# Patient Record
Sex: FEMALE | Race: WHITE | NOT HISPANIC OR LATINO | Employment: OTHER | URBAN - METROPOLITAN AREA
[De-identification: names, ages, dates, MRNs, and addresses within clinical notes are randomized per-mention and may not be internally consistent; named-entity substitution may affect disease eponyms.]

---

## 2017-01-28 ENCOUNTER — TELEPHONE (OUTPATIENT)
Dept: INTERNAL MEDICINE | Facility: CLINIC | Age: 72
End: 2017-01-28

## 2017-02-21 DIAGNOSIS — R68.81 EARLY SATIETY: ICD-10-CM

## 2017-02-21 DIAGNOSIS — Z72.0 TOBACCO USE: Primary | ICD-10-CM

## 2017-02-24 ENCOUNTER — OFFICE VISIT (OUTPATIENT)
Dept: INTERNAL MEDICINE | Facility: CLINIC | Age: 72
End: 2017-02-24

## 2017-02-24 VITALS
RESPIRATION RATE: 16 BRPM | WEIGHT: 261.4 LBS | BODY MASS INDEX: 51.32 KG/M2 | SYSTOLIC BLOOD PRESSURE: 112 MMHG | TEMPERATURE: 98.6 F | HEIGHT: 60 IN | HEART RATE: 64 BPM | DIASTOLIC BLOOD PRESSURE: 70 MMHG

## 2017-02-24 DIAGNOSIS — E53.8 B12 DEFICIENCY: ICD-10-CM

## 2017-02-24 DIAGNOSIS — E03.8 OTHER SPECIFIED HYPOTHYROIDISM: ICD-10-CM

## 2017-02-24 DIAGNOSIS — Z72.0 TOBACCO ABUSE: ICD-10-CM

## 2017-02-24 DIAGNOSIS — K59.00 CONSTIPATION, UNSPECIFIED CONSTIPATION TYPE: ICD-10-CM

## 2017-02-24 DIAGNOSIS — Z11.59 NEED FOR HEPATITIS C SCREENING TEST: ICD-10-CM

## 2017-02-24 DIAGNOSIS — Z00.00 ENCOUNTER FOR MEDICARE ANNUAL WELLNESS EXAM: Primary | ICD-10-CM

## 2017-02-24 DIAGNOSIS — F41.9 ANXIETY: ICD-10-CM

## 2017-02-24 DIAGNOSIS — E55.9 VITAMIN D DEFICIENCY: ICD-10-CM

## 2017-02-24 DIAGNOSIS — I10 ESSENTIAL HYPERTENSION: ICD-10-CM

## 2017-02-24 LAB
BILIRUB BLD-MCNC: NEGATIVE MG/DL
CLARITY, POC: CLEAR
COLOR UR: YELLOW
GLUCOSE UR STRIP-MCNC: NEGATIVE MG/DL
KETONES UR QL: NEGATIVE
LEUKOCYTE EST, POC: NEGATIVE
NITRITE UR-MCNC: NEGATIVE MG/ML
PH UR: 5 [PH] (ref 5–8)
PROT UR STRIP-MCNC: NEGATIVE MG/DL
RBC # UR STRIP: NEGATIVE /UL
SP GR UR: 1.03 (ref 1–1.03)
UROBILINOGEN UR QL: ABNORMAL

## 2017-02-24 PROCEDURE — 99406 BEHAV CHNG SMOKING 3-10 MIN: CPT | Performed by: INTERNAL MEDICINE

## 2017-02-24 PROCEDURE — G0439 PPPS, SUBSEQ VISIT: HCPCS | Performed by: INTERNAL MEDICINE

## 2017-02-24 PROCEDURE — 81003 URINALYSIS AUTO W/O SCOPE: CPT | Performed by: INTERNAL MEDICINE

## 2017-02-24 PROCEDURE — 96160 PT-FOCUSED HLTH RISK ASSMT: CPT | Performed by: INTERNAL MEDICINE

## 2017-02-24 RX ORDER — DOXYCYCLINE HYCLATE 100 MG/1
100 CAPSULE ORAL DAILY
COMMUNITY
End: 2019-11-20

## 2017-02-24 RX ORDER — ALPRAZOLAM 0.5 MG/1
0.5 TABLET ORAL 2 TIMES DAILY PRN
Qty: 60 TABLET | Refills: 2 | Status: SHIPPED | OUTPATIENT
Start: 2017-02-24 | End: 2017-08-23 | Stop reason: SDUPTHER

## 2017-02-24 RX ORDER — LEVOTHYROXINE SODIUM 137 UG/1
TABLET ORAL
Qty: 30 TABLET | Refills: 3 | Status: CANCELLED | OUTPATIENT
Start: 2017-02-24

## 2017-02-24 NOTE — PROGRESS NOTES
"Subjective   Carol Cooley is a 71 y.o. female.     History of Present Illness     Here for wellness exam  specialists:  derm - Massa  uro - Tao  gi - pezzi  living will - has but needs to update.   depression screen neg - 0  Fall risk - normal  Memory screen - neg  d daily, senna daily  Exercise - she has been physically active as she moved. Feels like she can start walking more  Diet - tries to eat healthy, but not a lot of veggies. Tries to stay away from sweets. On b12 and D 2000, senna  Smoking about 5 cigs a day    uti prevention - she is on doxy now as macrobid was too expensive    Anxiety - takes xanax 1-2 Day generally. No problems w/ it. Filled in 2/17, but would like to go ahead and get refill today while here.      She is still smoking 1 pack per week but wants to try to quit again      The following portions of the patient's history were reviewed and updated as appropriate: allergies, current medications, past family history, past medical history, past social history, past surgical history and problem list.    Review of Systems   Constitutional: Positive for activity change (more active). Negative for appetite change, fatigue, fever and unexpected weight change.   Respiratory: Negative for cough and shortness of breath.    Cardiovascular: Negative for chest pain and leg swelling.   Gastrointestinal: Negative for abdominal pain, blood in stool and diarrhea.        Usuallly regular several days in a row, then no bm for several days   Genitourinary: Negative for dysuria, frequency and hematuria.   Skin:        H/o lichen simplex chronicus   Psychiatric/Behavioral: The patient is nervous/anxious.         Xanax working well       Objective   Blood pressure 112/70, pulse 64, temperature 98.6 °F (37 °C), temperature source Temporal Artery , resp. rate 16, height 60.3\" (153.2 cm), weight 261 lb 6.4 oz (119 kg).  Physical Exam   Constitutional: She is oriented to person, place, and time. She appears " well-developed and well-nourished.   HENT:   Head: Normocephalic and atraumatic.   Right Ear: External ear normal.   Left Ear: External ear normal.   Mouth/Throat: Oropharynx is clear and moist. No oropharyngeal exudate.   Eyes: Conjunctivae and EOM are normal. Pupils are equal, round, and reactive to light. Right eye exhibits no discharge. Left eye exhibits no discharge.   Neck: Normal range of motion. Neck supple. No JVD present. No thyromegaly present.   Cardiovascular: Normal rate, regular rhythm, normal heart sounds and intact distal pulses.  Exam reveals no gallop and no friction rub.    No murmur heard.  Pulmonary/Chest: Breath sounds normal. No respiratory distress. She has no wheezes. She has no rales. Right breast exhibits no inverted nipple, no mass, no nipple discharge, no skin change and no tenderness. Left breast exhibits no inverted nipple, no mass, no nipple discharge, no skin change and no tenderness.   Abdominal: Soft. Bowel sounds are normal. She exhibits no distension and no mass. There is no tenderness. There is no guarding.   Neurological: She is alert and oriented to person, place, and time.   Skin: Skin is warm and dry.   Lichen simplex chronicus changes   Psychiatric: She has a normal mood and affect. Her behavior is normal. Judgment and thought content normal.       Assessment/Plan   Carol was seen today for annual exam.    Diagnoses and all orders for this visit:    Encounter for Medicare annual wellness exam.   -     POCT urinalysis dipstick, automatedregular exercise/healthy diet. weight loss encouraged. BSE q month. colon is scheduled for 3/17.she has had pneumovax and prevnar. she declines  zostavax.    lung ca screening due in 6/17. dexa due in '18.. labs today. pap done 1/16 and was normal.      Constipation, unspecified constipation type    Other specified hypothyroidism  -     TSH    Anxiety. Pt has already signed controlled substance contract. Navneet done today and appropriate.  UDS next visit  -     ALPRAZolam (XANAX) 0.5 MG tablet; Take 1 tablet by mouth 2 (Two) Times a Day As Needed for anxiety.      Essential hypertension - good control  -     Comprehensive Metabolic Panel  -     Lipid Panel  -     CBC & Differential    Vitamin D deficiency  -     Vitamin D 25 Hydroxy    B12 deficiency  -     Vitamin B12    Need for hepatitis C screening test  -     Hepatitis C Antibody    Tobacco abuse - Smoking cessation encouraged - ~5 minutes spent on smoking cessation.

## 2017-02-25 LAB
25(OH)D3+25(OH)D2 SERPL-MCNC: 35.2 NG/ML
ALBUMIN SERPL-MCNC: 4.1 G/DL (ref 3.2–4.8)
ALBUMIN/GLOB SERPL: 1.6 G/DL (ref 1.5–2.5)
ALP SERPL-CCNC: 62 U/L (ref 25–100)
ALT SERPL-CCNC: 21 U/L (ref 7–40)
AST SERPL-CCNC: 22 U/L (ref 0–33)
BASOPHILS # BLD AUTO: 0.07 10*3/MM3 (ref 0–0.2)
BASOPHILS NFR BLD AUTO: 0.8 % (ref 0–1)
BILIRUB SERPL-MCNC: 0.4 MG/DL (ref 0.3–1.2)
BUN SERPL-MCNC: 17 MG/DL (ref 9–23)
BUN/CREAT SERPL: 17 (ref 7–25)
CALCIUM SERPL-MCNC: 9.8 MG/DL (ref 8.7–10.4)
CHLORIDE SERPL-SCNC: 109 MMOL/L (ref 99–109)
CHOLEST SERPL-MCNC: 174 MG/DL (ref 0–200)
CO2 SERPL-SCNC: 24 MMOL/L (ref 20–31)
CREAT SERPL-MCNC: 1 MG/DL (ref 0.6–1.3)
EOSINOPHIL # BLD AUTO: 0.23 10*3/MM3 (ref 0.1–0.3)
EOSINOPHIL NFR BLD AUTO: 2.7 % (ref 0–3)
ERYTHROCYTE [DISTWIDTH] IN BLOOD BY AUTOMATED COUNT: 13.3 % (ref 11.3–14.5)
GLOBULIN SER CALC-MCNC: 2.6 GM/DL
GLUCOSE SERPL-MCNC: 85 MG/DL (ref 70–100)
HCT VFR BLD AUTO: 41.1 % (ref 34.5–44)
HCV AB S/CO SERPL IA: 0.1 S/CO RATIO (ref 0–0.9)
HDLC SERPL-MCNC: 55 MG/DL (ref 40–60)
HGB BLD-MCNC: 13.3 G/DL (ref 11.5–15.5)
IMM GRANULOCYTES # BLD: 0.02 10*3/MM3 (ref 0–0.03)
IMM GRANULOCYTES NFR BLD: 0.2 % (ref 0–0.6)
LDLC SERPL CALC-MCNC: 104 MG/DL (ref 0–100)
LYMPHOCYTES # BLD AUTO: 2.06 10*3/MM3 (ref 0.6–4.8)
LYMPHOCYTES NFR BLD AUTO: 23.8 % (ref 24–44)
MCH RBC QN AUTO: 30.2 PG (ref 27–31)
MCHC RBC AUTO-ENTMCNC: 32.4 G/DL (ref 32–36)
MCV RBC AUTO: 93.4 FL (ref 80–99)
MONOCYTES # BLD AUTO: 0.86 10*3/MM3 (ref 0–1)
MONOCYTES NFR BLD AUTO: 9.9 % (ref 0–12)
NEUTROPHILS # BLD AUTO: 5.42 10*3/MM3 (ref 1.5–8.3)
NEUTROPHILS NFR BLD AUTO: 62.6 % (ref 41–71)
PLATELET # BLD AUTO: 176 10*3/MM3 (ref 150–450)
POTASSIUM SERPL-SCNC: 4.3 MMOL/L (ref 3.5–5.5)
PROT SERPL-MCNC: 6.7 G/DL (ref 5.7–8.2)
RBC # BLD AUTO: 4.4 10*6/MM3 (ref 3.89–5.14)
SODIUM SERPL-SCNC: 142 MMOL/L (ref 132–146)
TRIGL SERPL-MCNC: 76 MG/DL (ref 0–150)
TSH SERPL DL<=0.005 MIU/L-ACNC: 2.44 MIU/ML (ref 0.35–5.35)
VIT B12 SERPL-MCNC: >2000 PG/ML (ref 211–911)
VLDLC SERPL CALC-MCNC: 15.2 MG/DL
WBC # BLD AUTO: 8.66 10*3/MM3 (ref 3.5–10.8)

## 2017-02-27 ENCOUNTER — TELEPHONE (OUTPATIENT)
Dept: INTERNAL MEDICINE | Facility: CLINIC | Age: 72
End: 2017-02-27

## 2017-02-27 DIAGNOSIS — Z12.11 SCREENING FOR COLON CANCER: Primary | ICD-10-CM

## 2017-02-27 DIAGNOSIS — K21.9 GASTROESOPHAGEAL REFLUX DISEASE, ESOPHAGITIS PRESENCE NOT SPECIFIED: Primary | ICD-10-CM

## 2017-02-27 NOTE — TELEPHONE ENCOUNTER
----- Message from Briana Nelson MD sent at 2/25/2017 11:42 AM EST -----  Regarding: lab results  Can you fax her labs from last week to Dr Eden Hammer and Dr Tao (urology)

## 2017-03-04 ENCOUNTER — RESULTS ENCOUNTER (OUTPATIENT)
Dept: GASTROENTEROLOGY | Facility: CLINIC | Age: 72
End: 2017-03-04

## 2017-03-04 DIAGNOSIS — Z12.11 SCREENING FOR COLON CANCER: ICD-10-CM

## 2017-03-04 DIAGNOSIS — K21.9 GASTROESOPHAGEAL REFLUX DISEASE, ESOPHAGITIS PRESENCE NOT SPECIFIED: ICD-10-CM

## 2017-03-21 RX ORDER — ATENOLOL 50 MG/1
50 TABLET ORAL DAILY
Qty: 90 TABLET | Refills: 1 | Status: SHIPPED | OUTPATIENT
Start: 2017-03-21 | End: 2017-03-29 | Stop reason: SDUPTHER

## 2017-03-27 ENCOUNTER — TELEPHONE (OUTPATIENT)
Dept: INTERNAL MEDICINE | Facility: CLINIC | Age: 72
End: 2017-03-27

## 2017-03-27 NOTE — TELEPHONE ENCOUNTER
----- Message from Gayathri Franz sent at 3/27/2017  2:26 PM EDT -----  PLEASE RESEND PT RX FOR ATENOLOL PHARMACY DID NOT REC

## 2017-03-28 DIAGNOSIS — Z12.11 COLON CANCER SCREENING: Primary | ICD-10-CM

## 2017-03-29 ENCOUNTER — TELEPHONE (OUTPATIENT)
Dept: INTERNAL MEDICINE | Facility: CLINIC | Age: 72
End: 2017-03-29

## 2017-03-29 RX ORDER — ATENOLOL 50 MG/1
50 TABLET ORAL DAILY
Qty: 90 TABLET | Refills: 1 | Status: SHIPPED | OUTPATIENT
Start: 2017-03-29 | End: 2018-03-21

## 2017-03-29 NOTE — TELEPHONE ENCOUNTER
----- Message from Sheela Lake sent at 3/29/2017  9:05 AM EDT -----  Contact: [facility] pharmacy  Pharmacy called about Ms Cooley's  atenolog 50 mg, says they never received the script [phone]

## 2017-04-25 RX ORDER — LEVOTHYROXINE SODIUM 137 UG/1
TABLET ORAL
Qty: 30 TABLET | Refills: 3 | Status: SHIPPED | OUTPATIENT
Start: 2017-04-25 | End: 2017-08-28 | Stop reason: SDUPTHER

## 2017-05-23 ENCOUNTER — TELEPHONE (OUTPATIENT)
Dept: INTERNAL MEDICINE | Facility: CLINIC | Age: 72
End: 2017-05-23

## 2017-05-23 ENCOUNTER — CLINICAL SUPPORT (OUTPATIENT)
Dept: INTERNAL MEDICINE | Facility: CLINIC | Age: 72
End: 2017-05-23

## 2017-05-23 VITALS — DIASTOLIC BLOOD PRESSURE: 66 MMHG | SYSTOLIC BLOOD PRESSURE: 125 MMHG

## 2017-05-23 DIAGNOSIS — I10 ESSENTIAL HYPERTENSION: ICD-10-CM

## 2017-06-16 ENCOUNTER — PATIENT MESSAGE (OUTPATIENT)
Dept: INTERNAL MEDICINE | Facility: CLINIC | Age: 72
End: 2017-06-16

## 2017-06-16 DIAGNOSIS — Z87.891 PERSONAL HISTORY OF TOBACCO USE, PRESENTING HAZARDS TO HEALTH: Primary | ICD-10-CM

## 2017-06-19 ENCOUNTER — HOSPITAL ENCOUNTER (OUTPATIENT)
Dept: CT IMAGING | Facility: HOSPITAL | Age: 72
Discharge: HOME | End: 2017-06-19
Admitting: INTERNAL MEDICINE

## 2017-06-19 PROCEDURE — G0297 LDCT FOR LUNG CA SCREEN: HCPCS

## 2017-08-01 RX ORDER — BUPROPION HYDROCHLORIDE 150 MG/1
TABLET ORAL
Qty: 30 TABLET | Refills: 0 | Status: SHIPPED | OUTPATIENT
Start: 2017-08-01 | End: 2017-08-28 | Stop reason: SDUPTHER

## 2017-08-01 RX ORDER — BUPROPION HYDROCHLORIDE 300 MG/1
TABLET ORAL
Qty: 30 TABLET | Refills: 0 | Status: SHIPPED | OUTPATIENT
Start: 2017-08-01 | End: 2017-08-28 | Stop reason: SDUPTHER

## 2017-08-23 ENCOUNTER — OFFICE VISIT (OUTPATIENT)
Dept: INTERNAL MEDICINE | Facility: CLINIC | Age: 72
End: 2017-08-23

## 2017-08-23 VITALS
BODY MASS INDEX: 44.25 KG/M2 | TEMPERATURE: 97.7 F | HEART RATE: 79 BPM | RESPIRATION RATE: 16 BRPM | HEIGHT: 60 IN | WEIGHT: 225.4 LBS | SYSTOLIC BLOOD PRESSURE: 118 MMHG | OXYGEN SATURATION: 95 % | DIASTOLIC BLOOD PRESSURE: 68 MMHG

## 2017-08-23 DIAGNOSIS — F41.9 ANXIETY: ICD-10-CM

## 2017-08-23 DIAGNOSIS — G47.09 OTHER INSOMNIA: Primary | ICD-10-CM

## 2017-08-23 DIAGNOSIS — E03.8 OTHER SPECIFIED HYPOTHYROIDISM: ICD-10-CM

## 2017-08-23 DIAGNOSIS — Z79.899 HIGH RISK MEDICATIONS (NOT ANTICOAGULANTS) LONG-TERM USE: ICD-10-CM

## 2017-08-23 DIAGNOSIS — I10 ESSENTIAL HYPERTENSION: ICD-10-CM

## 2017-08-23 DIAGNOSIS — Z72.0 TOBACCO ABUSE: ICD-10-CM

## 2017-08-23 PROCEDURE — 99213 OFFICE O/P EST LOW 20 MIN: CPT | Performed by: INTERNAL MEDICINE

## 2017-08-23 PROCEDURE — 99406 BEHAV CHNG SMOKING 3-10 MIN: CPT | Performed by: INTERNAL MEDICINE

## 2017-08-23 RX ORDER — ALPRAZOLAM 0.5 MG/1
0.5 TABLET ORAL 2 TIMES DAILY PRN
Qty: 60 TABLET | Refills: 2 | Status: SHIPPED | OUTPATIENT
Start: 2017-08-23 | End: 2018-03-21 | Stop reason: SDUPTHER

## 2017-08-23 NOTE — PROGRESS NOTES
"Subjective   Carol Cooley is a 72 y.o. female.     History of Present Illness     htn - we had decreased her atenolol to 1/2 qd in May as she had a few low readings. Have been still a little low in the 100-120s/40-60s. Pulse a little higher in the 80s and occasionally 90s     Insomnia/anxiety - takes xanax 1-2 Day generally. No problems w/ it. Does not make her groggy next day     She is still smoking 1 pack every 3 days. She does have patches but not using    She has been doing weight watchers and has lost about 30 lbs. Plans to start exercising soon too    The following portions of the patient's history were reviewed and updated as appropriate: allergies, current medications, past family history, past medical history, past social history, past surgical history and problem list.    Review of Systems   Constitutional: Positive for activity change (more active) and unexpected weight change (trying to lsoe weight). Negative for appetite change, fatigue and fever.   Respiratory: Negative for cough and shortness of breath.    Cardiovascular: Negative for chest pain and leg swelling.   Gastrointestinal: Negative for abdominal pain, blood in stool and diarrhea.        Usuallly regular several days in a row, then no bm for several days   Genitourinary: Negative for dysuria, frequency and hematuria.   Skin:        H/o lichen simplex chronicus   Psychiatric/Behavioral: Positive for sleep disturbance. The patient is nervous/anxious.         Xanax working well       Objective   Blood pressure 118/68, pulse 79, temperature 97.7 °F (36.5 °C), temperature source Temporal Artery , resp. rate 16, height 60.3\" (153.2 cm), weight 225 lb 6.4 oz (102 kg), SpO2 95 %.  Physical Exam   Constitutional: She is oriented to person, place, and time. She appears well-developed and well-nourished. No distress.   HENT:   Head: Normocephalic and atraumatic.   Eyes: Conjunctivae and EOM are normal.   Cardiovascular: Normal rate, regular rhythm " and normal heart sounds.  Exam reveals no gallop and no friction rub.    No murmur heard.  Pulmonary/Chest: Effort normal and breath sounds normal. No respiratory distress. She has no wheezes.   Neurological: She is alert and oriented to person, place, and time.   Skin: Skin is warm and dry. She is not diaphoretic.   Psychiatric: She has a normal mood and affect. Her behavior is normal. Judgment and thought content normal.       Assessment/Plan   Carol was seen today for med refill.    Diagnoses and all orders for this visit:    Anxiety/ insomnia -Pt has already signed controlled substance contract. Navneet done today and appropriate. UDS due today  -     ALPRAZolam (XANAX) 0.5 MG tablet; Take 1 tablet by mouth 2 (Two) Times a Day As Needed for Anxiety.    High risk medications (not anticoagulants) long-term use  -     Urine Drug Screen    Tobacco abuse - Smoking cessation encouraged - ~5 minutes spent on smoking cessation.     HTN - good control - ok to wean to 1/2 qod on atenolol x 1 week then d/c. Continue to monitor BP and HR - call if any problems    Hypothyroid - recheck in 6m

## 2017-08-28 RX ORDER — BUPROPION HYDROCHLORIDE 300 MG/1
300 TABLET ORAL EVERY MORNING
Qty: 90 TABLET | Refills: 1 | Status: SHIPPED | OUTPATIENT
Start: 2017-08-28 | End: 2018-03-21 | Stop reason: SDUPTHER

## 2017-08-28 RX ORDER — LEVOTHYROXINE SODIUM 137 UG/1
137 TABLET ORAL DAILY
Qty: 90 TABLET | Refills: 1 | Status: SHIPPED | OUTPATIENT
Start: 2017-08-28 | End: 2018-03-21 | Stop reason: SDUPTHER

## 2017-08-28 RX ORDER — BUPROPION HYDROCHLORIDE 150 MG/1
150 TABLET ORAL EVERY MORNING
Qty: 90 TABLET | Refills: 1 | Status: SHIPPED | OUTPATIENT
Start: 2017-08-28 | End: 2018-03-21 | Stop reason: SDUPTHER

## 2017-09-01 RX ORDER — LEVOTHYROXINE SODIUM 137 UG/1
TABLET ORAL
Qty: 30 TABLET | Refills: 5 | Status: SHIPPED | OUTPATIENT
Start: 2017-09-01 | End: 2018-03-26 | Stop reason: SDUPTHER

## 2017-09-01 RX ORDER — BUPROPION HYDROCHLORIDE 300 MG/1
TABLET ORAL
Qty: 30 TABLET | Refills: 5 | Status: SHIPPED | OUTPATIENT
Start: 2017-09-01 | End: 2018-03-21 | Stop reason: SDUPTHER

## 2017-09-01 RX ORDER — BUPROPION HYDROCHLORIDE 150 MG/1
TABLET ORAL
Qty: 30 TABLET | Refills: 5 | Status: SHIPPED | OUTPATIENT
Start: 2017-09-01 | End: 2018-03-21 | Stop reason: SDUPTHER

## 2017-09-18 ENCOUNTER — TELEPHONE (OUTPATIENT)
Dept: INTERNAL MEDICINE | Facility: CLINIC | Age: 72
End: 2017-09-18

## 2017-09-18 NOTE — TELEPHONE ENCOUNTER
----- Message from Carol Lenora sent at 9/18/2017  2:27 PM EDT -----  Regarding: Non-Urgent Medical Question  Contact: [phone]  FYI. After eliminating the Atenolol my bp went up but not much, however, my pulse was staying between 90 and 95.  Since I'm exercising more I decided to take 1/4 of an Atenolol daily.  Seems to be working well.  Been staying around 110/70 and pulse in the low 70's resting.  I have plenty but thought you should know in case I need a refill before my next visit.  Let me know if you think I'm wrong on this.  Thank you.

## 2017-10-03 ENCOUNTER — CLINICAL SUPPORT (OUTPATIENT)
Dept: INTERNAL MEDICINE | Facility: CLINIC | Age: 72
End: 2017-10-03

## 2017-10-03 PROCEDURE — 90662 IIV NO PRSV INCREASED AG IM: CPT | Performed by: FAMILY MEDICINE

## 2017-10-03 PROCEDURE — G0008 ADMIN INFLUENZA VIRUS VAC: HCPCS | Performed by: FAMILY MEDICINE

## 2018-03-21 ENCOUNTER — TELEPHONE (OUTPATIENT)
Dept: INTERNAL MEDICINE | Facility: CLINIC | Age: 73
End: 2018-03-21

## 2018-03-21 ENCOUNTER — OFFICE VISIT (OUTPATIENT)
Dept: INTERNAL MEDICINE | Facility: CLINIC | Age: 73
End: 2018-03-21

## 2018-03-21 VITALS
TEMPERATURE: 97.9 F | HEART RATE: 81 BPM | WEIGHT: 183 LBS | OXYGEN SATURATION: 96 % | HEIGHT: 60 IN | SYSTOLIC BLOOD PRESSURE: 122 MMHG | DIASTOLIC BLOOD PRESSURE: 70 MMHG | BODY MASS INDEX: 35.93 KG/M2

## 2018-03-21 DIAGNOSIS — Z78.0 POSTMENOPAUSAL: ICD-10-CM

## 2018-03-21 DIAGNOSIS — N39.0 URINARY TRACT INFECTION WITHOUT HEMATURIA, SITE UNSPECIFIED: ICD-10-CM

## 2018-03-21 DIAGNOSIS — K59.09 OTHER CONSTIPATION: ICD-10-CM

## 2018-03-21 DIAGNOSIS — Z00.00 ROUTINE GENERAL MEDICAL EXAMINATION AT A HEALTH CARE FACILITY: ICD-10-CM

## 2018-03-21 DIAGNOSIS — Z00.00 MEDICARE ANNUAL WELLNESS VISIT, SUBSEQUENT: Primary | ICD-10-CM

## 2018-03-21 DIAGNOSIS — F41.9 ANXIETY: ICD-10-CM

## 2018-03-21 DIAGNOSIS — G47.09 OTHER INSOMNIA: ICD-10-CM

## 2018-03-21 DIAGNOSIS — K59.00 CONSTIPATION, UNSPECIFIED CONSTIPATION TYPE: ICD-10-CM

## 2018-03-21 DIAGNOSIS — I10 ESSENTIAL HYPERTENSION: ICD-10-CM

## 2018-03-21 DIAGNOSIS — E03.9 ACQUIRED HYPOTHYROIDISM: ICD-10-CM

## 2018-03-21 DIAGNOSIS — Z72.0 TOBACCO ABUSE: ICD-10-CM

## 2018-03-21 LAB
BILIRUB BLD-MCNC: NEGATIVE MG/DL
CLARITY, POC: ABNORMAL
COLOR UR: YELLOW
GLUCOSE UR STRIP-MCNC: NEGATIVE MG/DL
KETONES UR QL: NEGATIVE
LEUKOCYTE EST, POC: ABNORMAL
NITRITE UR-MCNC: POSITIVE MG/ML
PH UR: 5.5 [PH] (ref 5–8)
PROT UR STRIP-MCNC: NEGATIVE MG/DL
RBC # UR STRIP: ABNORMAL /UL
SP GR UR: 1.03 (ref 1–1.03)
UROBILINOGEN UR QL: NORMAL

## 2018-03-21 PROCEDURE — 99397 PER PM REEVAL EST PAT 65+ YR: CPT | Performed by: INTERNAL MEDICINE

## 2018-03-21 PROCEDURE — 99406 BEHAV CHNG SMOKING 3-10 MIN: CPT | Performed by: INTERNAL MEDICINE

## 2018-03-21 PROCEDURE — 96160 PT-FOCUSED HLTH RISK ASSMT: CPT | Performed by: INTERNAL MEDICINE

## 2018-03-21 PROCEDURE — G0439 PPPS, SUBSEQ VISIT: HCPCS | Performed by: INTERNAL MEDICINE

## 2018-03-21 PROCEDURE — 81003 URINALYSIS AUTO W/O SCOPE: CPT | Performed by: INTERNAL MEDICINE

## 2018-03-21 RX ORDER — BUPROPION HYDROCHLORIDE 300 MG/1
300 TABLET ORAL DAILY
Qty: 30 TABLET | Refills: 11 | Status: SHIPPED | OUTPATIENT
Start: 2018-03-21 | End: 2019-02-25 | Stop reason: SDUPTHER

## 2018-03-21 RX ORDER — ALPRAZOLAM 0.5 MG/1
0.5 TABLET ORAL 2 TIMES DAILY PRN
Qty: 60 TABLET | Refills: 2 | Status: SHIPPED | OUTPATIENT
Start: 2018-03-21 | End: 2018-09-17 | Stop reason: SDUPTHER

## 2018-03-21 RX ORDER — BUPROPION HYDROCHLORIDE 150 MG/1
150 TABLET ORAL DAILY
Qty: 30 TABLET | Refills: 11 | Status: SHIPPED | OUTPATIENT
Start: 2018-03-21 | End: 2019-02-25 | Stop reason: SDUPTHER

## 2018-03-21 NOTE — TELEPHONE ENCOUNTER
----- Message from Briana Nelson MD sent at 3/21/2018  2:33 PM EDT -----  Regarding: colon report  She had colon at [facility] last year - can we get report

## 2018-03-21 NOTE — PROGRESS NOTES
"Subjective   Carol Cooley is a 73 y.o. female.     History of Present Illness   Here for wellness exam and physical    She had seen Dr Tao in the fall and had UTI then though asymptomatic and he did treat her w/ antibiotics and she is on doxy daily for prevention. Her next appt w/ him is in May    specialists:  derm - Jaquelin  uro - Dinh  gi - pezzi  Eyes - high  living will - has but needs to update.   depression screen neg - 0  Fall risk - neg, balance fair  Memory screen - good, no problems  Exercise - she is trying to walk regualrly and do yoga once a week  Diet -she is doing weight watchers On b12 and D 2000     Anxiety - takes xanax 1-2 Day generally. No problems w/ it.      tob abuse -  She is still smoking 1 pack per week     The following portions of the patient's history were reviewed and updated as appropriate: allergies, current medications, past family history, past medical history, past social history, past surgical history and problem list.    Review of Systems  Constitutional: Positive for activity change (more active). Negative for appetite change, fatigue, fever ;  weight  Down about 40 lbs since 8/17 - doing weight watchers  Respiratory: Negative for cough and shortness of breath.  doesn't need advair but occasionally  Cardiovascular: Negative for chest pain and leg swelling.   Gastrointestinal: Negative for abdominal pain, blood in stool and diarrhea.        Usuallly regular several days in a row, then no bm for several days. Uses dulcolax prn  Genitourinary: Negative for dysuria, frequency and hematuria.h/o frequent UTIs  Skin:        H/o lichen simplex chronicus   Psychiatric/Behavioral: The patient is nervous/anxious.         Xanax working well           Objective   Physical Exam   Vitals:    03/21/18 1359   BP: 122/70   BP Location: Right arm   Pulse: 81   Temp: 97.9 °F (36.6 °C)   TempSrc: Temporal Artery    SpO2: 96%   Weight: 83 kg (183 lb)   Height: 153.2 cm (60.3\") "     Constitutional: She is oriented to person, place, and time. She appears well-developed and well-nourished.   HENT:   Head: Normocephalic and atraumatic.   Right Ear: External ear normal.   Left Ear: External ear normal.   Mouth/Throat: Oropharynx is clear and moist. No oropharyngeal exudate.   Eyes: Conjunctivae and EOM are normal. Pupils are equal, round, and reactive to light. Right eye exhibits no discharge. Left eye exhibits no discharge.   Neck: Normal range of motion. Neck supple. No JVD present. No thyromegaly present.   Cardiovascular: Normal rate, regular rhythm, normal heart sounds and intact distal pulses.  Exam reveals no gallop and no friction rub.    No murmur heard.  Pulmonary/Chest: Breath sounds normal. No respiratory distress. She has no wheezes. She has no rales. Right breast exhibits no inverted nipple, no mass, no nipple discharge, no skin change and no tenderness. Left breast exhibits no inverted nipple, no mass, no nipple discharge, no skin change and no tenderness.   Abdominal: Soft. Bowel sounds are normal. She exhibits no distension and no mass. There is no tenderness. There is no guarding.   Neurological: She is alert and oriented to person, place, and time.   Skin: Skin is warm and dry.   Lichen simplex chronicus changes   Psychiatric: She has a normal mood and affect. Her behavior is normal. Judgment and thought content normal.     Assessment/Plan   Carol was seen today for annual exam.    Diagnoses and all orders for this visit:    Medicare annual wellness visit, subsequent -regular exercise/healthy diet. Doing well w/ weight loss. Todd is due now (last one 2/17) - numbers given. BSE q month. colon was done 3/17 at [facility] - recheck in 3 yrs (polyps).she has had pneumovax and prevnar. she declines  zostavax.  shingrex discussed -  can check at pharmacy since we do not have yet  lung ca screening due in 6/18. dexa due now - will schedule. labs today. pap done 1/16 and was  normal.    Routine general medical examination at a health care facility  -     POC Urinalysis Dipstick, Automated    Acquired hypothyroidism  -     TSH    Anxiety- Pt has already signed controlled substance contract. Navneet done today and appropriate. UDS due 8/18  -     ALPRAZolam (XANAX) 0.5 MG tablet; Take 1 tablet by mouth 2 (Two) Times a Day As Needed for Anxiety.      Essential hypertension  -     Comprehensive Metabolic Panel  -     CBC & Differential  -     Lipid Panel    Tobacco abuse - smoking cessation encouraged - ~5 minutes spent on smoking cessation.     Urinary tract infection without hematuria, site unspecified - asymptomatic, on daily doxy. Will check culture  -     Urine Culture - Urine, Urine, Clean Catch    Postmenopausal  -     DEXA Bone Density Axial      Other constipation - we discussed other options. Will try linzess again

## 2018-03-22 ENCOUNTER — TRANSCRIBE ORDERS (OUTPATIENT)
Dept: ADMINISTRATIVE | Facility: HOSPITAL | Age: 73
End: 2018-03-22

## 2018-03-22 DIAGNOSIS — Z12.31 VISIT FOR SCREENING MAMMOGRAM: Primary | ICD-10-CM

## 2018-03-25 LAB
ALBUMIN SERPL-MCNC: 4.2 G/DL (ref 3.2–4.8)
ALBUMIN/GLOB SERPL: 1.8 G/DL (ref 1.5–2.5)
ALP SERPL-CCNC: 66 U/L (ref 25–100)
ALT SERPL-CCNC: 30 U/L (ref 7–40)
AST SERPL-CCNC: 30 U/L (ref 0–33)
BACTERIA UR CULT: ABNORMAL
BACTERIA UR CULT: ABNORMAL
BASOPHILS # BLD AUTO: 0.09 10*3/MM3 (ref 0–0.2)
BASOPHILS NFR BLD AUTO: 0.9 % (ref 0–1)
BILIRUB SERPL-MCNC: 0.4 MG/DL (ref 0.3–1.2)
BUN SERPL-MCNC: 19 MG/DL (ref 9–23)
BUN/CREAT SERPL: 21.1 (ref 7–25)
CALCIUM SERPL-MCNC: 9.6 MG/DL (ref 8.7–10.4)
CHLORIDE SERPL-SCNC: 109 MMOL/L (ref 99–109)
CHOLEST SERPL-MCNC: 183 MG/DL (ref 0–200)
CO2 SERPL-SCNC: 22 MMOL/L (ref 20–31)
CREAT SERPL-MCNC: 0.9 MG/DL (ref 0.6–1.3)
EOSINOPHIL # BLD AUTO: 0.25 10*3/MM3 (ref 0–0.3)
EOSINOPHIL NFR BLD AUTO: 2.4 % (ref 0–3)
ERYTHROCYTE [DISTWIDTH] IN BLOOD BY AUTOMATED COUNT: 13.3 % (ref 11.3–14.5)
GFR SERPLBLD CREATININE-BSD FMLA CKD-EPI: 62 ML/MIN/1.73
GFR SERPLBLD CREATININE-BSD FMLA CKD-EPI: 75 ML/MIN/1.73
GLOBULIN SER CALC-MCNC: 2.4 GM/DL
GLUCOSE SERPL-MCNC: 76 MG/DL (ref 70–100)
HCT VFR BLD AUTO: 43.9 % (ref 34.5–44)
HDLC SERPL-MCNC: 52 MG/DL (ref 40–60)
HGB BLD-MCNC: 14.5 G/DL (ref 11.5–15.5)
IMM GRANULOCYTES # BLD: 0.03 10*3/MM3 (ref 0–0.03)
IMM GRANULOCYTES NFR BLD: 0.3 % (ref 0–0.6)
LDLC SERPL CALC-MCNC: 112 MG/DL (ref 0–100)
LYMPHOCYTES # BLD AUTO: 2.26 10*3/MM3 (ref 0.6–4.8)
LYMPHOCYTES NFR BLD AUTO: 21.9 % (ref 24–44)
MCH RBC QN AUTO: 30.7 PG (ref 27–31)
MCHC RBC AUTO-ENTMCNC: 33 G/DL (ref 32–36)
MCV RBC AUTO: 93 FL (ref 80–99)
MONOCYTES # BLD AUTO: 0.63 10*3/MM3 (ref 0–1)
MONOCYTES NFR BLD AUTO: 6.1 % (ref 0–12)
NEUTROPHILS # BLD AUTO: 7.05 10*3/MM3 (ref 1.5–8.3)
NEUTROPHILS NFR BLD AUTO: 68.4 % (ref 41–71)
OTHER ANTIBIOTIC SUSC ISLT: ABNORMAL
PLATELET # BLD AUTO: 204 10*3/MM3 (ref 150–450)
POTASSIUM SERPL-SCNC: 4.1 MMOL/L (ref 3.5–5.5)
PROT SERPL-MCNC: 6.6 G/DL (ref 5.7–8.2)
RBC # BLD AUTO: 4.72 10*6/MM3 (ref 3.89–5.14)
SODIUM SERPL-SCNC: 139 MMOL/L (ref 132–146)
TRIGL SERPL-MCNC: 94 MG/DL (ref 0–150)
TSH SERPL DL<=0.005 MIU/L-ACNC: 1.43 MIU/ML (ref 0.35–5.35)
VLDLC SERPL CALC-MCNC: 18.8 MG/DL
WBC # BLD AUTO: 10.31 10*3/MM3 (ref 3.5–10.8)

## 2018-03-26 ENCOUNTER — TELEPHONE (OUTPATIENT)
Dept: INTERNAL MEDICINE | Facility: CLINIC | Age: 73
End: 2018-03-26

## 2018-03-26 RX ORDER — CIPROFLOXACIN 500 MG/1
500 TABLET, FILM COATED ORAL EVERY 12 HOURS SCHEDULED
Qty: 14 TABLET | Refills: 0 | Status: SHIPPED | OUTPATIENT
Start: 2018-03-26 | End: 2018-05-29

## 2018-03-26 RX ORDER — LEVOTHYROXINE SODIUM 137 UG/1
137 TABLET ORAL DAILY
Qty: 30 TABLET | Refills: 11 | Status: SHIPPED | OUTPATIENT
Start: 2018-03-26 | End: 2019-02-28 | Stop reason: SDUPTHER

## 2018-03-26 NOTE — TELEPHONE ENCOUNTER
----- Message from Briana Nelson MD sent at 3/26/2018  7:39 AM EDT -----  Can you fax her labs to dr Tamela murphy? thanks

## 2018-03-27 ENCOUNTER — HOSPITAL ENCOUNTER (OUTPATIENT)
Dept: BONE DENSITY | Facility: HOSPITAL | Age: 73
Discharge: HOME | End: 2018-03-27
Attending: INTERNAL MEDICINE | Admitting: INTERNAL MEDICINE

## 2018-03-27 PROCEDURE — 77080 DXA BONE DENSITY AXIAL: CPT

## 2018-04-02 ENCOUNTER — TELEPHONE (OUTPATIENT)
Dept: INTERNAL MEDICINE | Facility: CLINIC | Age: 73
End: 2018-04-02

## 2018-04-02 NOTE — TELEPHONE ENCOUNTER
----- Message from Briana Nelson MD sent at 3/31/2018 11:19 AM EDT -----  Regarding: labs  Can you also send her labs/urine to Dr Tao, urology

## 2018-04-25 ENCOUNTER — APPOINTMENT (OUTPATIENT)
Dept: MAMMOGRAPHY | Facility: HOSPITAL | Age: 73
End: 2018-04-25
Attending: INTERNAL MEDICINE

## 2018-05-28 NOTE — PROGRESS NOTES
History of Present Illness     Answers for HPI/ROS submitted by the patient on 5/22/2018   Lower extremity pain  Incident occurred: more than 1 week ago  Incident location: other  Injury mechanism: unknown  Pain location: left hip  Pain quality: aching  Pain - numeric: 5/10  Pain course: intermittent  tingling: Yes  Aggravated by: movement  Pain starts in L buttuck and goes down L leg to calf. Doesn't always goes down leg, sometimes it feels more in just the hip. Will take asa, aleve, advil 2-3x/day - helps. Did start when she resumed yoga last month. No pain laying on it; does hurt when she 1st gets up and is better after a few minutes      Constipation - she has tried linzess daily but still not going regularly. If she doesn't take anything, can take 3-4 days before she has a BM Still will have to use dulcolax about 2x/week and then will have to stay at home as BMs are loose. She stays well hydrated. Hasn't tried metamucil recently; hasn't tried miralax in the past. She ended up stopping the linzess as she felt it wasn't working  We have that she had been in the amitiza and it caused severe diarrhea but she does not recall this and would like to try    The following portions of the patient's history were reviewed and updated as appropriate: allergies, current medications, past family history, past medical history, past social history, past surgical history and problem list.    Review of Systems   Constitutional: Negative for fatigue and fever.   Respiratory: Negative for shortness of breath.    Cardiovascular: Negative for chest pain.   Gastrointestinal: Negative for abdominal pain. +chronic constipation  MS: left hip pain; back pain occasionally  Neuro: some tingling down L leg      Objective    Vitals:    05/29/18 1248   BP: 130/68   Pulse: 78   Resp: 16   Temp: 98.7 °F (37.1 °C)   SpO2: 96%     Physical Exam   Constitutional: oriented to person, place, and time. well-developed and well-nourished. No distress.    HENT:   Head: Normocephalic and atraumatic.   Eyes: Conjunctivae and EOM are normal.   Cardiovascular: Normal rate, regular rhythm and normal heart sounds.  Exam reveals no gallop and no friction rub.    No murmur heard.  Pulmonary/Chest: Effort normal and breath sounds normal. No respiratory distress.  no wheezes.   Abd: soft, NTND  Neurological:  alert and oriented to person, place, and time. DTR 2/4 B LE  MS: +left SLR. Some decrease in left hip int rotation. No tenderness in lower back or L hip  Skin: Skin is warm and dry. not diaphoretic.   Psychiatric: normal mood and affect. Behavior and judgement normal  MS:    Assessment/Plan   Carol was seen today for hip pain.    Diagnoses and all orders for this visit:    Hip pain, left - not clear if this pain is from hip or back. Will check xrs. Back exxercises given.   -     XR Spine Lumbar 2 or 3 View  -     XR Hip With or Without Pelvis 2 - 3 View Left    Sciatica, left side  -     XR Spine Lumbar 2 or 3 View  -     XR Hip With or Without Pelvis 2 - 3 View Left    Chronic constipation - d/c linzess and we will try amitiza  -     lubiprostone (AMITIZA) 8 MCG capsule; Take 1 capsule by mouth 2 (Two) Times a Day With Meals.      -     Cancel: lubiprostone (AMITIZA) 24 MCG capsule; Take 1 capsule by mouth 2 (Two) Times a Day With Meals.

## 2018-05-29 ENCOUNTER — OFFICE VISIT (OUTPATIENT)
Dept: INTERNAL MEDICINE | Facility: CLINIC | Age: 73
End: 2018-05-29

## 2018-05-29 VITALS
DIASTOLIC BLOOD PRESSURE: 68 MMHG | TEMPERATURE: 98.7 F | HEIGHT: 60 IN | OXYGEN SATURATION: 96 % | RESPIRATION RATE: 16 BRPM | BODY MASS INDEX: 33.18 KG/M2 | SYSTOLIC BLOOD PRESSURE: 130 MMHG | WEIGHT: 169 LBS | HEART RATE: 78 BPM

## 2018-05-29 DIAGNOSIS — K59.09 CHRONIC CONSTIPATION: ICD-10-CM

## 2018-05-29 DIAGNOSIS — M54.32 SCIATICA, LEFT SIDE: ICD-10-CM

## 2018-05-29 DIAGNOSIS — M25.552 HIP PAIN, LEFT: Primary | ICD-10-CM

## 2018-05-29 PROCEDURE — 99214 OFFICE O/P EST MOD 30 MIN: CPT | Performed by: INTERNAL MEDICINE

## 2018-05-29 RX ORDER — LUBIPROSTONE 8 UG/1
8 CAPSULE ORAL 2 TIMES DAILY WITH MEALS
Qty: 60 CAPSULE | Refills: 5 | Status: SHIPPED | OUTPATIENT
Start: 2018-05-29 | End: 2018-09-17 | Stop reason: ALTCHOICE

## 2018-05-29 RX ORDER — LUBIPROSTONE 24 UG/1
24 CAPSULE ORAL 2 TIMES DAILY WITH MEALS
Status: CANCELLED | OUTPATIENT
Start: 2018-05-29

## 2018-05-30 ENCOUNTER — HOSPITAL ENCOUNTER (OUTPATIENT)
Dept: GENERAL RADIOLOGY | Facility: HOSPITAL | Age: 73
Discharge: HOME | End: 2018-05-30
Attending: INTERNAL MEDICINE | Admitting: INTERNAL MEDICINE

## 2018-05-30 PROCEDURE — 73502 X-RAY EXAM HIP UNI 2-3 VIEWS: CPT

## 2018-05-30 PROCEDURE — 72100 X-RAY EXAM L-S SPINE 2/3 VWS: CPT

## 2018-05-31 DIAGNOSIS — M51.36 DDD (DEGENERATIVE DISC DISEASE), LUMBAR: ICD-10-CM

## 2018-05-31 DIAGNOSIS — Z87.891 PERSONAL HISTORY OF TOBACCO USE, PRESENTING HAZARDS TO HEALTH: Primary | ICD-10-CM

## 2018-05-31 DIAGNOSIS — M16.12 PRIMARY OSTEOARTHRITIS OF LEFT HIP: ICD-10-CM

## 2018-05-31 RX ORDER — MELOXICAM 15 MG/1
TABLET ORAL
Qty: 30 TABLET | Refills: 5 | Status: SHIPPED | OUTPATIENT
Start: 2018-05-31 | End: 2019-04-12 | Stop reason: SDUPTHER

## 2018-06-26 ENCOUNTER — HOSPITAL ENCOUNTER (OUTPATIENT)
Dept: PHYSICAL THERAPY | Facility: HOSPITAL | Age: 73
Setting detail: THERAPIES SERIES
Discharge: HOME | End: 2018-06-26

## 2018-06-26 ENCOUNTER — HOSPITAL ENCOUNTER (OUTPATIENT)
Dept: CT IMAGING | Facility: HOSPITAL | Age: 73
Discharge: HOME | End: 2018-06-26
Attending: INTERNAL MEDICINE | Admitting: INTERNAL MEDICINE

## 2018-06-26 DIAGNOSIS — M16.12 PRIMARY OSTEOARTHRITIS OF LEFT HIP: Primary | ICD-10-CM

## 2018-06-26 PROCEDURE — G8978 MOBILITY CURRENT STATUS: HCPCS

## 2018-06-26 PROCEDURE — G0297 LDCT FOR LUNG CA SCREEN: HCPCS

## 2018-06-26 PROCEDURE — G8979 MOBILITY GOAL STATUS: HCPCS

## 2018-06-26 PROCEDURE — 97161 PT EVAL LOW COMPLEX 20 MIN: CPT | Performed by: PHYSICAL THERAPIST

## 2018-06-26 NOTE — THERAPY EVALUATION
Outpatient Physical Therapy Ortho Initial Evaluation   [facility]     Patient Name: Carol Cooley  : 1945  MRN: [id number]  Today's Date: 2018      Visit Date: 2018    Patient Active Problem List   Diagnosis   • Hypothyroidism   • Hypertension   • Anxiety   • Adjustment reaction with prolonged depressive reaction   • Disorder of bone and cartilage   • Benign essential hypertension   • Migraine   • Lichenification and lichen simplex chronicus   • ADHD (attention deficit hyperactivity disorder)        Past Medical History:   Diagnosis Date   • Benign essential hypertension    • Colon polyp 2017    TA   • Diverticulosis 2017    by colon   • H/O bone density study 2016    osteopenia slightly worse in B hips, continue D, add weight bearing exercise, recheck in 2 years, frax ok   • H/O cardiovascular stress test 2011    ech - mild LVH, abnormal relaxation   • H/O colonoscopy 2007    repeat  - Scheduled for 3/8/17.   • H/[facility] mammogram 2017     [facility]   • Hypothyroidism    • Lichenification and lichen simplex chronicus    • Migraine    • Pap smear for cervical cancer screening 2016    normal; 2012 ([facility])   • Squamous metaplasia of cervix         Past Surgical History:   Procedure Laterality Date   • CYSTOSCOPY  10/2015    cystoscope w/ urethral dilation - Dr. Tao - squamous metaplasia       Visit Dx:     ICD-10-CM ICD-9-CM   1. Primary osteoarthritis of left hip M16.12 715.15             Patient History     Row Name 18 1400             History    Chief Complaint Pain  -RB      Type of Pain Hip pain  -RB      Brief Description of Current Complaint Pt presents with complaint of L hip pn that began about 3 months ago, no DELORIS. She would have difficulty keeping weight shifted to L leg when standing long periods. At night would get irritation in the lateral shin. Pn was not activity limiting.  Located primarily in the left gluteal region, can radiate  down to lateral shin when laying on her L side. However over past month has seemed to improve significantly.  -RB      Previous treatment for THIS PROBLEM Medication  -RB      Patient/Caregiver Goals Relieve pain  -RB      Current Tobacco Use Yes  -RB      Smoking Status 1/2 pk/day-  -RB      Patient's Rating of General Health Very good  -RB      Hand Dominance right-handed  -RB      Occupation/sports/leisure activities Pt enjoys exercise: walking, light jogging, yoga.  -RB      What clinical tests have you had for this problem? X-ray  -RB      Results of Clinical Tests degenerative changes L hip and lumbar spine/SI joints  -RB         Pain     Pain Location Hip  -RB      Pain at Present 0  -RB      Pain at Best 0  -RB      Pain at Worst 4  -RB      Pain Frequency Intermittent  -RB      Pain Description Aching  -RB      What Performance Factors Make the Current Problem(s) WORSE? laying on L side, prolonged standing  -RB      What Performance Factors Make the Current Problem(s) BETTER? rest  -RB      Is your sleep disturbed? No  -RB         Fall Risk Assessment    Any falls in the past year: No  -RB         Daily Activities    Primary Language English  -RB      Are you able to read Yes  -RB      Are you able to write Yes  -RB      How does patient learn best? Listening  -RB      Teaching needs identified Home Exercise Program;Management of Condition  -RB      Patient is concerned about/has problems with Standing;Performing home management (household chores, shopping, care of dependents)  -RB      Does patient have problems with the following? Depression;Anxiety;Panic Attack  -RB      Barriers to learning None  -RB      Pt Participated in POC and Goals Yes  -RB         Safety    Are you being hurt, hit, or frightened by anyone at home or in your life? No  -RB      Are you being neglected by a caregiver No  -RB        User Key  (r) = Recorded By, (t) = Taken By, (c) = Cosigned By    Initials Name Provider Type    RB  Maryann Adler, PT Physical Therapist                PT Ortho     Row Name 06/26/18 1400       Precautions and Contraindications    Precautions/Limitations no known precautions/limitations  -RB       Posture/Observations    Posture/Observations Comments Pt presents to clinic ambulating independently w/o apparent deviation. Postural exam grossly unremarkable. Pelvis level. Pt reports very mild TTP over L piriformis  -RB       Quarter Clearing    Quarter Clearing Lower Quarter Clearing  -RB       DTR- Lower Quarter Clearing    Patellar tendon (L2-4) 2- Normal response  -RB    Achilles tendon (S1-2) 2- Normal response  -RB       Neural Tension Signs- Lower Quarter Clearing    Slump Negative  -RB    Well Slump Negative  -RB    SLR Negative  -RB    Prone knee flexion Negative  -RB       Sensory Screen for Light Touch- Lower Quarter Clearing    L1 (inguinal area) Intact  -RB    L2 (anterior mid thigh) Intact  -RB    L3 (distal anterior thigh) Intact  -RB    L4 (medial lower leg/foot) Intact  -RB    L5 (lateral lower leg/great toe) Intact  -RB    S1 (bottom of foot) Intact  -RB       Myotomal Screen- Lower Quarter Clearing    Hip flexion (L2) 5 (Normal)  -RB    Knee extension (L3) 5 (Normal)  -RB    Ankle DF (L4) 5 (Normal)  -RB    Great toe extension (L5) 5 (Normal)  -RB    Ankle PF (S1) 5 (Normal)  -RB    Knee flexion (S2) 5 (Normal)  -RB       SI/Hip Screen- Lower Quarter Clearing    Gaenslen's test Negative  -RB    ASIS compression Negative  -RB    ASIS distraction Negative  -RB    Alma Rosa's/Cristóbal's test Negative  -RB    Posterior thigh sheer Negative  -RB       Special Tests/Palpation    Special Tests/Palpation Lumbar/SI;Hip  -RB       Hip Special Tests    Keerthi’s test (tightness of ITB) Negative  -RB    Piriformis test (piriformis syndrome) Left:;Positive  -RB       General ROM    Head/Neck/Trunk Trunk Extension;Trunk Flexion;Trunk Lt Lateral Flexion;Trunk Rt Lateral Flexion;Trunk Lt Rotation;Trunk Rt Rotation  -RB     GENERAL ROM COMMENTS Hip IR R 36 deg/L 19 deg, ER R 45 deg/L 55 deg  -RB       Head/Neck/Trunk    Trunk Extension AROM WNL no pn  -RB    Trunk Flexion AROM WNL no pn  -RB    Trunk Lt Lateral Flexion AROM WNL no pn  -RB    Trunk Rt Lateral Flexion AROM WNL no pn  -RB    Trunk Lt Rotation AROM WNL no pn  -RB    Trunk Rt Rotation AROM WNL no pn  -RB       MMT (Manual Muscle Testing)    Additional Documentation General Assessment (Manual Muscle Testing) (Group)  -RB       General Assessment (Manual Muscle Testing)    General Manual Muscle Testing (MMT) Assessment lower extremity strength deficits identified  -RB       Lower Extremity (Manual Muscle Testing)    Lower Extremity: Manual Muscle Testing (MMT) left hip strength deficit;right hip strength deficit  -RB       Left Hip (Manual Muscle Testing)    Left Hip Manual Muscle Testing (MMT) abduction  -RB    MMT: ABduction, Left Hip abduction  -RB    MMT, Gross Movement: Left Hip ABduction (4+/5) good plus  -RB       Right Hip (Manual Muscle Testing)    Right Hip Manual Muscle Testing (MMT) abduction  -RB    MMT: ABduction, Right Hip abduction  -RB    MMT, Gross Movement: Right Hip ABduction (4+/5) good plus  -RB       Flexibility    Flexibility Tested? Lower Extremity  -RB       Lower Extremity Flexibility    Hamstrings WNL  -RB    ITB WNL  -RB    Hip External Rotators Left:;Mildly limited;Right:;WNL  -RB      User Key  (r) = Recorded By, (t) = Taken By, (c) = Cosigned By    Initials Name Provider Type    RB Maryann Adler, PT Physical Therapist                      Therapy Education  Education Details: Issued and reviewed supine piriformis stretches  Given: HEP  Program: New  How Provided: Verbal, Demonstration, Written  Provided to: Patient  Level of Understanding: Teach back education performed           PT OP Goals     Row Name 06/26/18 1536 06/26/18 1500       PT Short Term Goals    STG Date to Achieve  -- 07/24/18  -RB    STG 1  -- Pt to be independent w/ long term  HEP and self mgmt  -RB    STG 1 Progress  -- New  -RB    STG 2  -- Pt to report full resolution of hip and leg pn.  -RB    STG 2 Progress  -- New  -RB    STG 3  -- Pt to tolerate standing for more than 30 min w/o increase in pn.  -RB    STG 3 Progress  -- New  -RB    STG 4  -- Pt to demo symmetrical hip IR ROM.  -RB    STG 4 Progress  -- New  -RB       Time Calculation    PT Goal Re-Cert Due Date 09/24/18  -RB 09/24/18  -RB      User Key  (r) = Recorded By, (t) = Taken By, (c) = Cosigned By    Initials Name Provider Type    RB Maryann Adler, PT Physical Therapist                PT Assessment/Plan     Row Name 06/26/18 5725          PT Assessment    Functional Limitations Performance in leisure activities;Limitation in home management;Limitations in functional capacity and performance  -RB     Impairments Pain;Range of motion;Impaired flexibility  -RB     Assessment Comments Pt presents w/ complaint of L hip and LE pn, though reports that symptoms have significantly improved over past month. She demonstrates moderate deficits in L hip IR ROM 2/2 decreased piriformis length. Neural tension testing negative and pt demo full LE strength. She was issued HEP w/ piriformis stretches which she will trial independently and will contact clinic if needed for followup should pn return.  -RB     Please refer to paper survey for additional self-reported information Yes  -RB     Rehab Potential Excellent  -RB     Patient/caregiver participated in establishment of treatment plan and goals Yes  -RB     Patient would benefit from skilled therapy intervention Yes  -RB        PT Plan    PT Frequency 1x/week  -RB     Predicted Duration of Therapy Intervention (Therapy Eval) 4 wks if needed  -RB     Planned CPT's? PT EVAL LOW COMPLEXITY: 15645;PT RE-EVAL: 89354;PT THER PROC EA 15 MIN: 62126;PT MANUAL THERAPY EA 15 MIN: 30191;PT ULTRASOUND EA 15 MIN: 45122;PT HOT/COLD PACK WC NONMCARE: 25457  -RB     PT Plan Comments Pt to trial  independent HEP consisting of flexibility exercises, and to return to clinic if pn returns.  -RB       User Key  (r) = Recorded By, (t) = Taken By, (c) = Cosigned By    Initials Name Provider Type    RB Maryann Adler, PT Physical Therapist                              Outcome Measure Options: Lower Extremity Functional Scale (LEFS), Eloina Tinoco  Lower Extremity Functional Index  Any of your usual work, housework or school activities: No difficulty  Your usual hobbies, recreational or sporting activities: No difficulty  Getting into or out of the bath: No difficulty  Walking between rooms: No difficulty  Putting on your shoes or socks: No difficulty  Squatting: No difficulty  Lifting an object, like a bag of groceries from the floor: No difficulty  Performing light activities around your home: No difficulty  Performing heavy activities around your home: No difficulty  Getting into or out of a car: No difficulty  Walking 2 blocks: No difficulty  Walking a mile: No difficulty  Going up or down 10 stairs (about 1 flight of stairs): No difficulty  Standing for 1 hour: A little bit of difficulty  Sitting for 1 hour: No difficulty  Running on even ground: No difficulty  Running on uneven ground: A little bit of difficulty  Making sharp turns while running fast: No difficulty  Hopping: No difficulty  Rolling over in bed: No difficulty  Total: 78  Modified Oswestry  Modified Oswestry Score/Comments: 1/50      Time Calculation:     Therapy Suggested Charges     Code   Minutes Charges    None             Start Time: 1430     Therapy Charges for Today     Code Description Service Date Service Provider Modifiers Qty    40505664400 HC PT EVAL LOW COMPLEXITY 3 6/26/2018 Maryann Adler, PT GP 1          PT G-Codes  Outcome Measure Options: Lower Extremity Functional Scale (LEFS), Eloina Adler, PT  6/26/2018

## 2018-08-23 ENCOUNTER — DOCUMENTATION (OUTPATIENT)
Dept: PHYSICAL THERAPY | Facility: HOSPITAL | Age: 73
End: 2018-08-23

## 2018-08-23 DIAGNOSIS — M16.12 PRIMARY OSTEOARTHRITIS OF LEFT HIP: Primary | ICD-10-CM

## 2018-08-23 NOTE — THERAPY DISCHARGE NOTE
Outpatient Physical Therapy Discharge Summary         Patient Name: Carol Cooley  : 1945  MRN: [id number]    Today's Date: 2018    Visit Dx:    ICD-10-CM ICD-9-CM   1. Primary osteoarthritis of left hip M16.12 715.15             PT OP Goals     Row Name 18 1500          PT Short Term Goals    STG Date to Achieve 18  -RB     STG 1 Pt to be independent w/ long term HEP and self mgmt  -RB     STG 1 Progress Not Met  -RB     STG 2 Pt to report full resolution of hip and leg pn.  -RB     STG 2 Progress Not Met  -RB     STG 3 Pt to tolerate standing for more than 30 min w/o increase in pn.  -RB     STG 3 Progress Not Met  -RB     STG 4 Pt to demo symmetrical hip IR ROM.  -RB     STG 4 Progress Not Met  -RB       User Key  (r) = Recorded By, (t) = Taken By, (c) = Cosigned By    Initials Name Provider Type    Maryann Gaspar PT Physical Therapist          OP PT Discharge Summary  Date of Discharge: 18  Reason for Discharge: Independent  Outcomes Achieved: Other  Discharge Destination: Home with home program  Discharge Instructions/Additional Comments: Pt attended intial evaluation only and was issued HEP to trial independently as her symptoms had already improved significantly upon arrival. Pt has not returned to clinic and will be d/c. Unable to formally assess goals.      Time Calculation:        Therapy Suggested Charges     Code   Minutes Charges    None                       Maryann Adler, PT  2018

## 2018-09-17 ENCOUNTER — OFFICE VISIT (OUTPATIENT)
Dept: INTERNAL MEDICINE | Facility: CLINIC | Age: 73
End: 2018-09-17

## 2018-09-17 VITALS
HEIGHT: 60 IN | DIASTOLIC BLOOD PRESSURE: 62 MMHG | OXYGEN SATURATION: 98 % | TEMPERATURE: 98.8 F | BODY MASS INDEX: 32.28 KG/M2 | WEIGHT: 164.4 LBS | SYSTOLIC BLOOD PRESSURE: 114 MMHG | HEART RATE: 76 BPM

## 2018-09-17 DIAGNOSIS — Z79.899 HIGH RISK MEDICATIONS (NOT ANTICOAGULANTS) LONG-TERM USE: ICD-10-CM

## 2018-09-17 DIAGNOSIS — G47.09 OTHER INSOMNIA: ICD-10-CM

## 2018-09-17 DIAGNOSIS — E03.9 ACQUIRED HYPOTHYROIDISM: ICD-10-CM

## 2018-09-17 DIAGNOSIS — K59.09 CHRONIC CONSTIPATION: ICD-10-CM

## 2018-09-17 DIAGNOSIS — F41.9 ANXIETY: ICD-10-CM

## 2018-09-17 DIAGNOSIS — I10 BENIGN ESSENTIAL HYPERTENSION: Primary | ICD-10-CM

## 2018-09-17 DIAGNOSIS — Z23 NEED FOR INFLUENZA VACCINATION: ICD-10-CM

## 2018-09-17 PROCEDURE — 90662 IIV NO PRSV INCREASED AG IM: CPT | Performed by: INTERNAL MEDICINE

## 2018-09-17 PROCEDURE — 99214 OFFICE O/P EST MOD 30 MIN: CPT | Performed by: INTERNAL MEDICINE

## 2018-09-17 PROCEDURE — G0008 ADMIN INFLUENZA VIRUS VAC: HCPCS | Performed by: INTERNAL MEDICINE

## 2018-09-17 RX ORDER — ALPRAZOLAM 0.5 MG/1
0.5 TABLET ORAL 2 TIMES DAILY PRN
Qty: 60 TABLET | Refills: 2 | Status: SHIPPED | OUTPATIENT
Start: 2018-09-17 | End: 2019-02-25 | Stop reason: SDUPTHER

## 2018-09-17 NOTE — PROGRESS NOTES
"Subjective   Carol Cooley is a 73 y.o. female.     History of Present Illness   Anxiety - takes xanax 1 at night generally. No problems w/ it. Has some still from last refill but would like to go ahead and get today    tob abuse -  She is still smoking 1 pack per week     L Hip pain (DDD, SI and hip DJD)- did PT and is better. She will occasionally use mobic or baby asa    Balance issues - she feels like she is slightly off balance - has to hold on to something. In yoga, has trouble balancing on one foot. No dizziness, just more feeling off balance    Frequent UTI - taking daily doxy and has f/u w/ urology coming up    Constipation - she moves bowels infrequently. Currently takes 2 duloclax 2days a week, and generally has the BM the next day.   She has tried amitiza and linzess but neither has helped. She has tried Miralax but didn't like how it tasted  She does a lot of high fiber foods. She had colon in '17 and is due in '20      The following portions of the patient's history were reviewed and updated as appropriate: allergies, current medications, past family history, past medical history, past social history, past surgical history and problem list.    Review of Systems   Constitutional: Positive for unexpected weight loss. Negative for activity change, appetite change and unexpected weight gain.   Gastrointestinal: Positive for constipation. Negative for abdominal pain, blood in stool and diarrhea.   Genitourinary: Negative for dysuria, frequency and urgency.        Daily doxy has helped   Psychiatric/Behavioral: Positive for sleep disturbance. The patient is nervous/anxious.        Objective    Vitals:    09/17/18 1322   BP: 114/62   BP Location: Right arm   Pulse: 76   Temp: 98.8 °F (37.1 °C)   TempSrc: Temporal Artery    SpO2: 98%   Weight: 74.6 kg (164 lb 6.4 oz)   Height: 152.4 cm (60\")     Physical Exam   Constitutional: She is oriented to person, place, and time. She appears well-developed and " well-nourished. No distress.   HENT:   Head: Normocephalic and atraumatic.   Eyes: Pupils are equal, round, and reactive to light. EOM are normal.   Neck: Normal range of motion. Neck supple.   Cardiovascular: Normal rate and regular rhythm.    Pulmonary/Chest: Effort normal and breath sounds normal.   Musculoskeletal: She exhibits no edema.   Neurological: She is alert and oriented to person, place, and time. No cranial nerve deficit.   Skin: Skin is warm and dry. No rash noted. She is not diaphoretic.   Psychiatric: She has a normal mood and affect. Her behavior is normal. Judgment and thought content normal.   neg Romberg      Assessment/Plan   Carol was seen today for anxiety, immunizations and constipation.    Diagnoses and all orders for this visit:    Benign essential hypertension - good control off atenolol  -     CBC (No Diff)  -     Comprehensive Metabolic Panel    Acquired hypothyroidism  -     TSH    Anxiety - xanax refilled #60/2 rf. Pt has already signed controlled substance contract (renewed today). Navneet done today and appropriate. UDS due today - she can't leave urine, so will do oral test  -     Oral Fluid Drug Screen - Saliva, Oral Cavity    High risk medications (not anticoagulants) long-term use  -     Oral Fluid Drug Screen - Saliva, Oral Cavity    Need for influenza vaccination  -     Flu Vaccine High Dose PF 65YR+ ([id number])    Chronic constipation -Trulance 3mg samples given. If these don't work, she may try MOM    Poor balance - We discussed continuing the balance exercises w/ yoga, and discussed the gabapentin as potential cause

## 2018-09-18 LAB
ALBUMIN SERPL-MCNC: 4.05 G/DL (ref 3.2–4.8)
ALBUMIN/GLOB SERPL: 2.1 G/DL (ref 1.5–2.5)
ALP SERPL-CCNC: 65 U/L (ref 25–100)
ALT SERPL-CCNC: 29 U/L (ref 7–40)
AST SERPL-CCNC: 29 U/L (ref 0–33)
BILIRUB SERPL-MCNC: 0.5 MG/DL (ref 0.3–1.2)
BUN SERPL-MCNC: 21 MG/DL (ref 9–23)
BUN/CREAT SERPL: 23.3 (ref 7–25)
CALCIUM SERPL-MCNC: 9.2 MG/DL (ref 8.7–10.4)
CHLORIDE SERPL-SCNC: 107 MMOL/L (ref 99–109)
CO2 SERPL-SCNC: 23 MMOL/L (ref 20–31)
CREAT SERPL-MCNC: 0.9 MG/DL (ref 0.6–1.3)
ERYTHROCYTE [DISTWIDTH] IN BLOOD BY AUTOMATED COUNT: 12.7 % (ref 11.3–14.5)
GLOBULIN SER CALC-MCNC: 2 GM/DL
GLUCOSE SERPL-MCNC: 90 MG/DL (ref 70–100)
HCT VFR BLD AUTO: 43.1 % (ref 34.5–44)
HGB BLD-MCNC: 14 G/DL (ref 11.5–15.5)
MCH RBC QN AUTO: 30.6 PG (ref 27–31)
MCHC RBC AUTO-ENTMCNC: 32.5 G/DL (ref 32–36)
MCV RBC AUTO: 94.1 FL (ref 80–99)
PLATELET # BLD AUTO: 169 10*3/MM3 (ref 150–450)
POTASSIUM SERPL-SCNC: 4.2 MMOL/L (ref 3.5–5.5)
PROT SERPL-MCNC: 6 G/DL (ref 5.7–8.2)
RBC # BLD AUTO: 4.58 10*6/MM3 (ref 3.89–5.14)
SODIUM SERPL-SCNC: 140 MMOL/L (ref 132–146)
TSH SERPL DL<=0.005 MIU/L-ACNC: 2.28 MIU/ML (ref 0.35–5.35)
WBC # BLD AUTO: 8.23 10*3/MM3 (ref 3.5–10.8)

## 2019-02-24 NOTE — PROGRESS NOTES
"Subjective   Carol Cooley is a 73 y.o. female.     History of Present Illness     Here for f/u on:    HTN - she has been off atenolol. She is trying to exercise 3-4x/week at the gym, mostly cardio. Will do yoga soon    Anxiety - takes xanax 1 at night generally. No problems w/ it.     Constipation - I had given her Trulance samples last visit, but this didn't help. She did try metamucil daily, and helped a little. She has tried miralax, but didn't think it helped. She has been doing metamucil wafers daily    tob abuse -  She is still smoking 2 packs per week     The following portions of the patient's history were reviewed and updated as appropriate: allergies, current medications, past family history, past medical history, past social history, past surgical history and problem list.    Review of Systems   Constitutional: Negative for fever, unexpected weight gain and unexpected weight loss.   Gastrointestinal: Positive for constipation. Negative for diarrhea.   Genitourinary:        Frequent UTIs - daily doxy helping   Psychiatric/Behavioral: Positive for sleep disturbance. The patient is nervous/anxious.        Objective   /72 (BP Location: Left arm)   Temp 97 °F (36.1 °C) (Temporal)   Ht 152.4 cm (60\")   Wt 75.2 kg (165 lb 12.8 oz)   BMI 32.38 kg/m²   Physical Exam   Constitutional: She is oriented to person, place, and time. She appears well-developed and well-nourished. No distress.   HENT:   Head: Normocephalic and atraumatic.   Eyes: Conjunctivae and EOM are normal. Pupils are equal, round, and reactive to light. Right eye exhibits no discharge. Left eye exhibits no discharge.   Neck: Normal range of motion. Neck supple.   Cardiovascular: Normal rate and regular rhythm.   Pulmonary/Chest: Effort normal and breath sounds normal.   Musculoskeletal: She exhibits no edema.   Neurological: She is alert and oriented to person, place, and time. No cranial nerve deficit.   Skin: Skin is warm and dry. No " rash noted. She is not diaphoretic.   Psychiatric: She has a normal mood and affect. Her behavior is normal. Judgment and thought content normal.   Nursing note and vitals reviewed.        Assessment/Plan   Carol was seen today for hypertension, med refill, anxiety and insomnia.    Diagnoses and all orders for this visit:    Benign essential hypertension -controlled off atenolol  -     Comprehensive Metabolic Panel    Acquired hypothyroidism -check today  -     TSH  -     Lipid Panel        Tobacco abuse  Comments:  smoking cessation encouraged    Anxiety/Other insomnia -stable with Xanax.  Pt has already signed controlled substance contract (renewed today). Navneet done today and appropriate. UDS due 9/19  -     ALPRAZolam (XANAX) 0.5 MG tablet; Take 1 tablet by mouth 2 (Two) Times a Day As Needed for Anxiety or Sleep.    Other orders  -     buPROPion XL (WELLBUTRIN XL) 150 MG 24 hr tablet; Take 1 tablet by mouth Daily.  -     buPROPion XL (WELLBUTRIN XL) 300 MG 24 hr tablet; Take 1 tablet by mouth Daily.

## 2019-02-25 ENCOUNTER — OFFICE VISIT (OUTPATIENT)
Dept: INTERNAL MEDICINE | Facility: CLINIC | Age: 74
End: 2019-02-25

## 2019-02-25 VITALS
DIASTOLIC BLOOD PRESSURE: 72 MMHG | WEIGHT: 165.8 LBS | SYSTOLIC BLOOD PRESSURE: 128 MMHG | HEIGHT: 60 IN | TEMPERATURE: 97 F | BODY MASS INDEX: 32.55 KG/M2

## 2019-02-25 DIAGNOSIS — G47.09 OTHER INSOMNIA: ICD-10-CM

## 2019-02-25 DIAGNOSIS — E03.9 ACQUIRED HYPOTHYROIDISM: ICD-10-CM

## 2019-02-25 DIAGNOSIS — F41.9 ANXIETY: ICD-10-CM

## 2019-02-25 DIAGNOSIS — Z72.0 TOBACCO ABUSE: ICD-10-CM

## 2019-02-25 DIAGNOSIS — I10 BENIGN ESSENTIAL HYPERTENSION: Primary | ICD-10-CM

## 2019-02-25 PROCEDURE — 99214 OFFICE O/P EST MOD 30 MIN: CPT | Performed by: INTERNAL MEDICINE

## 2019-02-25 RX ORDER — ALPRAZOLAM 0.5 MG/1
0.5 TABLET ORAL 2 TIMES DAILY PRN
Qty: 60 TABLET | Refills: 2 | Status: SHIPPED | OUTPATIENT
Start: 2019-02-25 | End: 2019-07-09 | Stop reason: SDUPTHER

## 2019-02-25 RX ORDER — BUPROPION HYDROCHLORIDE 150 MG/1
150 TABLET ORAL DAILY
Qty: 30 TABLET | Refills: 11 | Status: SHIPPED | OUTPATIENT
Start: 2019-02-25 | End: 2020-03-17

## 2019-02-25 RX ORDER — BUPROPION HYDROCHLORIDE 300 MG/1
300 TABLET ORAL DAILY
Qty: 30 TABLET | Refills: 11 | Status: SHIPPED | OUTPATIENT
Start: 2019-02-25 | End: 2020-03-17

## 2019-02-26 LAB
ALBUMIN SERPL-MCNC: 4.18 G/DL (ref 3.2–4.8)
ALBUMIN/GLOB SERPL: 2.3 G/DL (ref 1.5–2.5)
ALP SERPL-CCNC: 61 U/L (ref 25–100)
ALT SERPL-CCNC: 22 U/L (ref 7–40)
AST SERPL-CCNC: 26 U/L (ref 0–33)
BILIRUB SERPL-MCNC: 0.4 MG/DL (ref 0.3–1.2)
BUN SERPL-MCNC: 21 MG/DL (ref 9–23)
BUN/CREAT SERPL: 25.3 (ref 7–25)
CALCIUM SERPL-MCNC: 9.4 MG/DL (ref 8.7–10.4)
CHLORIDE SERPL-SCNC: 111 MMOL/L (ref 99–109)
CHOLEST SERPL-MCNC: 185 MG/DL (ref 0–200)
CO2 SERPL-SCNC: 21 MMOL/L (ref 20–31)
CREAT SERPL-MCNC: 0.83 MG/DL (ref 0.6–1.3)
GLOBULIN SER CALC-MCNC: 1.8 GM/DL
GLUCOSE SERPL-MCNC: 78 MG/DL (ref 70–100)
HDLC SERPL-MCNC: 59 MG/DL (ref 40–60)
LDLC SERPL CALC-MCNC: 108 MG/DL (ref 0–100)
POTASSIUM SERPL-SCNC: 3.9 MMOL/L (ref 3.5–5.5)
PROT SERPL-MCNC: 6 G/DL (ref 5.7–8.2)
SODIUM SERPL-SCNC: 141 MMOL/L (ref 132–146)
TRIGL SERPL-MCNC: 88 MG/DL (ref 0–150)
TSH SERPL DL<=0.005 MIU/L-ACNC: 0.86 MIU/ML (ref 0.35–5.35)
VLDLC SERPL CALC-MCNC: 17.6 MG/DL

## 2019-02-28 RX ORDER — LEVOTHYROXINE SODIUM 137 UG/1
137 TABLET ORAL DAILY
Qty: 30 TABLET | Refills: 11 | Status: SHIPPED | OUTPATIENT
Start: 2019-02-28 | End: 2020-03-17

## 2019-03-06 ENCOUNTER — OFFICE VISIT (OUTPATIENT)
Dept: INTERNAL MEDICINE | Facility: CLINIC | Age: 74
End: 2019-03-06

## 2019-03-06 VITALS
RESPIRATION RATE: 16 BRPM | HEIGHT: 60 IN | WEIGHT: 169 LBS | HEART RATE: 87 BPM | DIASTOLIC BLOOD PRESSURE: 70 MMHG | OXYGEN SATURATION: 98 % | BODY MASS INDEX: 33.18 KG/M2 | SYSTOLIC BLOOD PRESSURE: 114 MMHG | TEMPERATURE: 99.1 F

## 2019-03-06 DIAGNOSIS — R30.0 DYSURIA: Primary | ICD-10-CM

## 2019-03-06 DIAGNOSIS — R39.11 URINARY HESITANCY: ICD-10-CM

## 2019-03-06 LAB
BILIRUB BLD-MCNC: NEGATIVE MG/DL
CLARITY, POC: CLEAR
COLOR UR: YELLOW
GLUCOSE UR STRIP-MCNC: NEGATIVE MG/DL
KETONES UR QL: NEGATIVE
LEUKOCYTE EST, POC: NEGATIVE
NITRITE UR-MCNC: NEGATIVE MG/ML
PH UR: 5 [PH] (ref 5–8)
PROT UR STRIP-MCNC: NEGATIVE MG/DL
RBC # UR STRIP: NEGATIVE /UL
SP GR UR: 1.01 (ref 1–1.03)
UROBILINOGEN UR QL: NORMAL

## 2019-03-06 PROCEDURE — 99214 OFFICE O/P EST MOD 30 MIN: CPT | Performed by: NURSE PRACTITIONER

## 2019-03-06 PROCEDURE — 81003 URINALYSIS AUTO W/O SCOPE: CPT | Performed by: NURSE PRACTITIONER

## 2019-03-06 NOTE — PROGRESS NOTES
Subjective   Carol Cooley is a 74 y.o. female.     Urinary Tract Infection    This is a new problem. The current episode started 1 to 4 weeks ago (10 days). The problem occurs every urination. The quality of the pain is described as burning. The pain is mild. There has been no fever. Associated symptoms include frequency and hesitancy. Pertinent negatives include no chills, discharge, flank pain, hematuria, nausea, possible pregnancy, sweats, urgency or vomiting. She has tried nothing for the symptoms. The treatment provided no relief. Her past medical history is significant for recurrent UTIs.      Patient presents to office today with her own urine sample in a cup that she sterilized with boiling water on her own at home today.  Reports that she is unable to leave a sample while she is in the office.  She states she did a urine test at home which showed leuks and a pH of 5.     The following portions of the patient's history were reviewed and updated as appropriate: allergies, current medications, past family history, past medical history, past social history, past surgical history and problem list.    Review of Systems   Constitutional: Negative for chills.   Gastrointestinal: Negative for nausea and vomiting.   Genitourinary: Positive for frequency and hesitancy. Negative for flank pain, hematuria and urgency.       Objective   Physical Exam   Constitutional: She is oriented to person, place, and time. She appears well-developed and well-nourished.   HENT:   Head: Normocephalic and atraumatic.   Cardiovascular: Normal rate.   Pulmonary/Chest: Effort normal. No respiratory distress.   Abdominal: Soft. She exhibits no distension. There is no tenderness. There is no rigidity and no CVA tenderness.   Neurological: She is alert and oriented to person, place, and time.   Skin: Skin is warm and dry. Capillary refill takes less than 2 seconds.   Psychiatric: She has a normal mood and affect. Her speech is normal and  behavior is normal.   Nursing note and vitals reviewed.      Assessment/Plan   Carol was seen today for urinary tract infection.    Diagnoses and all orders for this visit:    Dysuria  -     POCT urinalysis dipstick, automated  -     Urine Culture - Urine, Urine, Clean Catch    Urinary hesitancy      Brief Urine Lab Results  (Last result in the past 365 days)      Color   Clarity   Blood   Leuk Est   Nitrite   Protein   CREAT   Urine HCG        03/06/19 1348 Yellow Clear Negative Negative Negative Negative             Urine dip normal in office today.  Will send urine for culture for safe measure.   Encourage patient to increase fluids and empty bladder frequently.  Patient in agreement to hold off on antibiotics until culture results available.  Return if symptoms worsen or fail to improve.  Plan of care discussed with pt. They verbalized understanding and agreement.

## 2019-03-08 ENCOUNTER — TELEPHONE (OUTPATIENT)
Dept: INTERNAL MEDICINE | Facility: CLINIC | Age: 74
End: 2019-03-08

## 2019-03-08 LAB
BACTERIA UR CULT: NORMAL
BACTERIA UR CULT: NORMAL

## 2019-03-08 NOTE — TELEPHONE ENCOUNTER
----- Message from RUPINDER Ling sent at 3/8/2019  7:36 AM EST -----  Please let her know her urine culture was ok

## 2019-04-12 ENCOUNTER — OFFICE VISIT (OUTPATIENT)
Dept: INTERNAL MEDICINE | Facility: CLINIC | Age: 74
End: 2019-04-12

## 2019-04-12 VITALS
OXYGEN SATURATION: 98 % | SYSTOLIC BLOOD PRESSURE: 110 MMHG | WEIGHT: 167.2 LBS | BODY MASS INDEX: 32.83 KG/M2 | HEIGHT: 60 IN | TEMPERATURE: 98.6 F | HEART RATE: 79 BPM | DIASTOLIC BLOOD PRESSURE: 72 MMHG

## 2019-04-12 DIAGNOSIS — Z00.00 ENCOUNTER FOR MEDICARE ANNUAL WELLNESS EXAM: ICD-10-CM

## 2019-04-12 DIAGNOSIS — K59.09 CHRONIC CONSTIPATION: ICD-10-CM

## 2019-04-12 DIAGNOSIS — Z00.00 ROUTINE GENERAL MEDICAL EXAMINATION AT A HEALTH CARE FACILITY: Primary | ICD-10-CM

## 2019-04-12 PROCEDURE — G0439 PPPS, SUBSEQ VISIT: HCPCS | Performed by: INTERNAL MEDICINE

## 2019-04-12 PROCEDURE — 96160 PT-FOCUSED HLTH RISK ASSMT: CPT | Performed by: INTERNAL MEDICINE

## 2019-04-12 PROCEDURE — 99397 PER PM REEVAL EST PAT 65+ YR: CPT | Performed by: INTERNAL MEDICINE

## 2019-04-12 RX ORDER — MELOXICAM 15 MG/1
TABLET ORAL
Qty: 30 TABLET | Refills: 5 | Status: SHIPPED | OUTPATIENT
Start: 2019-04-12 | End: 2020-02-19

## 2019-04-12 RX ORDER — LACTULOSE 10 G/15ML
20 SOLUTION ORAL 2 TIMES DAILY
Qty: 946 ML | Refills: 11 | Status: SHIPPED | OUTPATIENT
Start: 2019-04-12 | End: 2019-11-20

## 2019-04-12 NOTE — PROGRESS NOTES
History of Present Illness     Here for medicare wellness exam and physical.  Patient is doing well overall.  No hospitalizations in the last year    specialists:  derm - Jaquelin  uro - Din[facility]  gi -[facility]  living will - has but needs to update.   depression screen neg - 0  Fall risk - normal  Memory screen - neg  d daily, senna daily  Exercise - she has been physically active as she moved. Feels like she can start walking more  Diet -trying to eat healthy, but still lke sweets and has been cookie decorating, so eating more sweets than she had been, and does intermittent fasting. On b12 and D 2000    Smoking about 4-5 cigs a day     uti prevention - she is on doxy now as macrobid was too expensive     Anxiety - takes xanax 1-2 Day generally. No problems w/ it.  .      ASCUS in '12, then repeat in '13 was neg w/ HPV testing. Pap was also normal in '16    Constipation-we tried Truelance but did not help.  We also tried amities and Linzess, but these did not help.  She has tried Metamucil and MOM - helped initially, but then did not. Has tried Mg citrate - didn't help. She takes the dulcolax 3 twice a week and will have BMs the day that she takes it. Drinks plenty of water a day    The following portions of the patient's history were reviewed and updated as appropriate: allergies, current medications, past family history, past medical history, past social history, past surgical history and problem list.    Review of Systems   Constitutional: Negative for activity change, appetite change, fever, unexpected weight gain and unexpected weight loss.   HENT: Negative.    Eyes: Negative.    Respiratory: Negative for shortness of breath and wheezing.    Cardiovascular: Negative for chest pain, palpitations and leg swelling.   Gastrointestinal: Positive for constipation.   Endocrine: Negative.    Genitourinary: Negative for difficulty urinating and dysuria.   Skin: Positive for rash (h/o lichen simplex).    Allergic/Immunologic: Negative for immunocompromised state.   Neurological: Negative for seizures, speech difficulty, memory problem and confusion.   Hematological: Does not bruise/bleed easily.   Psychiatric/Behavioral: Negative for agitation.         Objective    Vitals:    04/12/19 1552   BP: 110/72   Pulse: 79   Temp: 98.6 °F (37 °C)   SpO2: 98%     Physical Exam   Physical Exam   Constitutional: She is oriented to person, place, and time. She appears well-developed and well-nourished. No distress.   HENT:   Head: Normocephalic and atraumatic.   Right Ear: External ear normal.   Left Ear: External ear normal.   Nose: Nose normal.   Mouth/Throat: Oropharynx is clear and moist. No oropharyngeal exudate.   Eyes: Conjunctivae and EOM are normal. Pupils are equal, round, and reactive to light. Right eye exhibits no discharge. Left eye exhibits no discharge. No scleral icterus.   Neck: Normal range of motion. Neck supple. No thyromegaly present.   Cardiovascular: Normal rate, regular rhythm, normal heart sounds and intact distal pulses. Exam reveals no gallop and no friction rub.   No murmur heard.  Pulmonary/Chest: Effort normal and breath sounds normal. No respiratory distress. She has no wheezes. She has no rales. Right breast exhibits no mass, no nipple discharge, no skin change and no tenderness. Left breast exhibits no mass, no nipple discharge, no skin change and no tenderness.   Abdominal: Soft. Bowel sounds are normal. She exhibits no distension and no mass. There is no tenderness. There is no rebound and no guarding.   Musculoskeletal: Normal range of motion. She exhibits no edema or deformity.   Lymphadenopathy:     She has no cervical adenopathy.   Neurological: She is alert and oriented to person, place, and time. She displays normal reflexes. Coordination normal.   Skin: Skin is warm and dry. No rash noted. She is not diaphoretic. No erythema. No pallor.   Psychiatric: She has a normal mood and affect.  Her behavior is normal. Judgment and thought content normal.   Nursing note and vitals reviewed.         Assessment/Plan   Carol was seen today for annual exam, medicare wellness-subsequent, constipation and med refill.    Diagnoses and all orders for this visit:    Routine general medical examination at a health care facility/Encounter for Medicare annual wellness exam    Regular exercise/healthy diet. BSE q month. Sunscreen use encouraged. caclium intake reviewed.  Living will - pt has  Todd due 5/19 - has scheduled  Colon due 3/20 (h/o polyps, [facility])  Pneumovax - had  prevnar - had  DT due 1/24  DEXA due 3/20  Shingles - shingrix discussed -  can check at pharmacy since we do not have   Does not need paps since age 65 or older and last 2 paps were normal, and HPV was always neg    Chronic constipation  -     lactulose (CHRONULAC) 10 GM/15ML solution; Take 30 mL by mouth 2 (Two) Times a Day. Start 30ml qd and can increase to bid if needed    Other orders  -     meloxicam (MOBIC) 15 MG tablet; 1 qd prn pain    Hypothyroid - recheck in 8/19    She does not need xanax refill today. Pt has already signed controlled substance contract. Navneet done today and appropriate. UDS due 9/19

## 2019-06-06 RX ORDER — LINACLOTIDE 290 UG/1
CAPSULE, GELATIN COATED ORAL
Qty: 30 CAPSULE | Refills: 4 | OUTPATIENT
Start: 2019-06-06

## 2019-06-28 DIAGNOSIS — Z87.891 PERSONAL HISTORY OF NICOTINE DEPENDENCE: ICD-10-CM

## 2019-06-28 DIAGNOSIS — Z72.0 TOBACCO ABUSE: Primary | ICD-10-CM

## 2019-07-09 DIAGNOSIS — G47.09 OTHER INSOMNIA: ICD-10-CM

## 2019-07-09 RX ORDER — ALPRAZOLAM 0.5 MG/1
0.5 TABLET ORAL 2 TIMES DAILY PRN
Qty: 30 TABLET | Refills: 0 | Status: SHIPPED | OUTPATIENT
Start: 2019-07-09 | End: 2019-08-12 | Stop reason: SDUPTHER

## 2019-07-11 ENCOUNTER — HOSPITAL ENCOUNTER (OUTPATIENT)
Dept: CT IMAGING | Facility: HOSPITAL | Age: 74
Discharge: HOME | End: 2019-07-11
Admitting: INTERNAL MEDICINE

## 2019-07-11 DIAGNOSIS — Z87.891 PERSONAL HISTORY OF NICOTINE DEPENDENCE: ICD-10-CM

## 2019-07-11 DIAGNOSIS — Z72.0 TOBACCO ABUSE: ICD-10-CM

## 2019-07-11 PROCEDURE — G0297 LDCT FOR LUNG CA SCREEN: HCPCS

## 2019-08-10 PROBLEM — K59.09 CHRONIC CONSTIPATION: Status: ACTIVE | Noted: 2019-08-10

## 2019-08-10 NOTE — PROGRESS NOTES
"Subjective   Carol Cooley is a 74 y.o. female.     History of Present Illness     Here for f/u on:    Anxiety/poor sleep- takes xanax 1-2 Day generally. No problems w/ it.     Hypothyroid - last tsh was in 2/19 and was normal.  Weight is up some but she has not been walking as often as she had been because of the heat    L hip pain - starts in buttock and goes down lateral left leg sometimes -she did have hip and lumbar spine x-ray in 2018 that showed degenerative changes in the left hip, SI joint and lumbar spine. Pain started after she restarted yoga. Not as much pain w/ walking, but can hurt w/ sitting. She has been using heat, helps.  She is taking mobic in am, and also takes asa prn - these both help some  She did do one session PT and did help - but she feels like she had been doing similar stretches w/ the yoga    The following portions of the patient's history were reviewed and updated as appropriate: allergies, current medications, past family history, past medical history, past social history, past surgical history and problem list.    Review of Systems   Constitutional: Positive for unexpected weight gain. Negative for fever and unexpected weight loss.   Gastrointestinal: Positive for constipation.   Musculoskeletal: Positive for arthralgias.   Allergic/Immunologic: Negative for immunocompromised state.   Neurological: Positive for numbness (sometimes in the left leg).   Psychiatric/Behavioral: The patient is nervous/anxious.        Objective   /66 (BP Location: Left arm)   Pulse 74   Temp 98 °F (36.7 °C) (Temporal)   Ht 152.4 cm (60\")   Wt 81 kg (178 lb 9.6 oz)   SpO2 98%   BMI 34.88 kg/m²   Physical Exam   Constitutional: She is oriented to person, place, and time. She appears well-developed and well-nourished. No distress.   HENT:   Head: Normocephalic and atraumatic.   Eyes: Conjunctivae and EOM are normal. Pupils are equal, round, and reactive to light. Right eye exhibits no " discharge. Left eye exhibits no discharge.   Neck: Normal range of motion. Neck supple.   Cardiovascular: Normal rate and regular rhythm.   Pulmonary/Chest: Effort normal and breath sounds normal.   Musculoskeletal: She exhibits no edema or deformity.   Neg SLR B. Left hip int rotation slightly decreased c/w R   Neurological: She is alert and oriented to person, place, and time. She displays normal reflexes (2/4 patelar). No cranial nerve deficit.   Skin: Skin is warm and dry. No rash noted. She is not diaphoretic.   Psychiatric: She has a normal mood and affect. Her behavior is normal. Judgment and thought content normal.   Nursing note and vitals reviewed.        Assessment/Plan   Carol was seen today for hypertension, anxiety and hip pain.    Diagnoses and all orders for this visit:    Acquired hypothyroidism -we will go ahead and recheck levels today  -     TSH    Left leg pain  Comments:  probably a combination of lumbar ddd, SI/hip arthritis. we will try a short course of prednisone, and she also try a chiropractor.  We discussed consideration of an MRI of the L-spine if no improvement or if the left leg symptoms worsen.  Could also consider orthopedics consult to see if an injection would help.  She will let me know if no better  Orders:  -     predniSONE (DELTASONE) 10 MG tablet; 4qd x2d, then 3qd x2d, then 2qd x2d, then 1qd x2d    High risk medications (not anticoagulants) long-term use  -     Oral Fluid Drug Screen - Saliva, Oral Cavity  -     Comprehensive Metabolic Panel    Other insomnia -well controlled with Xanax.Pt has already signed controlled substance contract. Navneet done today and appropriate. UDS due 9/19 - will go ahead and do today. She does admit to using marijuana once in last week, so this might show up. Does not use regualrly  -     ALPRAZolam (XANAX) 0.5 MG tablet; Take 1 tablet by mouth 2 (Two) Times a Day As Needed for Anxiety or Sleep.

## 2019-08-12 ENCOUNTER — OFFICE VISIT (OUTPATIENT)
Dept: INTERNAL MEDICINE | Facility: CLINIC | Age: 74
End: 2019-08-12

## 2019-08-12 VITALS
DIASTOLIC BLOOD PRESSURE: 66 MMHG | SYSTOLIC BLOOD PRESSURE: 114 MMHG | WEIGHT: 178.6 LBS | HEART RATE: 74 BPM | OXYGEN SATURATION: 98 % | TEMPERATURE: 98 F | HEIGHT: 60 IN | BODY MASS INDEX: 35.06 KG/M2

## 2019-08-12 DIAGNOSIS — Z79.899 HIGH RISK MEDICATIONS (NOT ANTICOAGULANTS) LONG-TERM USE: ICD-10-CM

## 2019-08-12 DIAGNOSIS — E03.9 ACQUIRED HYPOTHYROIDISM: Primary | ICD-10-CM

## 2019-08-12 DIAGNOSIS — M79.605 LEFT LEG PAIN: ICD-10-CM

## 2019-08-12 DIAGNOSIS — G47.09 OTHER INSOMNIA: ICD-10-CM

## 2019-08-12 PROCEDURE — 99214 OFFICE O/P EST MOD 30 MIN: CPT | Performed by: INTERNAL MEDICINE

## 2019-08-12 RX ORDER — PREDNISONE 10 MG/1
TABLET ORAL
Qty: 20 TABLET | Refills: 0 | Status: SHIPPED | OUTPATIENT
Start: 2019-08-12 | End: 2019-11-20

## 2019-08-12 RX ORDER — ALPRAZOLAM 0.5 MG/1
0.5 TABLET ORAL 2 TIMES DAILY PRN
Qty: 60 TABLET | Refills: 2 | Status: SHIPPED | OUTPATIENT
Start: 2019-08-12 | End: 2020-01-20

## 2019-08-13 LAB
ALBUMIN SERPL-MCNC: 4.1 G/DL (ref 3.5–5.2)
ALBUMIN/GLOB SERPL: 2.1 G/DL
ALP SERPL-CCNC: 60 U/L (ref 39–117)
ALT SERPL-CCNC: 22 U/L (ref 1–33)
AST SERPL-CCNC: 22 U/L (ref 1–32)
BILIRUB SERPL-MCNC: 0.2 MG/DL (ref 0.2–1.2)
BUN SERPL-MCNC: 17 MG/DL (ref 8–23)
BUN/CREAT SERPL: 16.8 (ref 7–25)
CALCIUM SERPL-MCNC: 9 MG/DL (ref 8.6–10.5)
CHLORIDE SERPL-SCNC: 107 MMOL/L (ref 98–107)
CO2 SERPL-SCNC: 23.3 MMOL/L (ref 22–29)
CREAT SERPL-MCNC: 1.01 MG/DL (ref 0.57–1)
GLOBULIN SER CALC-MCNC: 2 GM/DL
GLUCOSE SERPL-MCNC: 86 MG/DL (ref 65–99)
POTASSIUM SERPL-SCNC: 4.6 MMOL/L (ref 3.5–5.2)
PROT SERPL-MCNC: 6.1 G/DL (ref 6–8.5)
SODIUM SERPL-SCNC: 142 MMOL/L (ref 136–145)
TSH SERPL DL<=0.005 MIU/L-ACNC: 2.41 UIU/ML (ref 0.45–4.5)

## 2019-11-13 ENCOUNTER — FLU SHOT (OUTPATIENT)
Dept: INTERNAL MEDICINE | Facility: CLINIC | Age: 74
End: 2019-11-13

## 2019-11-13 DIAGNOSIS — Z23 NEED FOR INFLUENZA VACCINATION: ICD-10-CM

## 2019-11-13 PROCEDURE — 90653 IIV ADJUVANT VACCINE IM: CPT | Performed by: INTERNAL MEDICINE

## 2019-11-13 PROCEDURE — G0008 ADMIN INFLUENZA VIRUS VAC: HCPCS | Performed by: INTERNAL MEDICINE

## 2019-11-20 ENCOUNTER — OFFICE VISIT (OUTPATIENT)
Dept: INTERNAL MEDICINE | Facility: CLINIC | Age: 74
End: 2019-11-20

## 2019-11-20 VITALS
SYSTOLIC BLOOD PRESSURE: 114 MMHG | HEART RATE: 89 BPM | TEMPERATURE: 97.9 F | WEIGHT: 175.8 LBS | DIASTOLIC BLOOD PRESSURE: 62 MMHG | BODY MASS INDEX: 34.51 KG/M2 | OXYGEN SATURATION: 98 % | HEIGHT: 60 IN
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
## 2019-11-20 DIAGNOSIS — S60.871A OTHER SUPERFICIAL BITE OF RIGHT WRIST, INITIAL ENCOUNTER: ICD-10-CM

## 2019-11-20 PROCEDURE — 90471 IMMUNIZATION ADMIN: CPT | Performed by: INTERNAL MEDICINE

## 2019-11-20 PROCEDURE — 99213 OFFICE O/P EST LOW 20 MIN: CPT | Performed by: INTERNAL MEDICINE

## 2019-11-20 PROCEDURE — 90714 TD VACC NO PRESV 7 YRS+ IM: CPT | Performed by: INTERNAL MEDICINE

## 2019-11-20 RX ORDER — AMOXICILLIN AND CLAVULANATE POTASSIUM 875; 125 MG/1; MG/1
1 TABLET, FILM COATED ORAL 2 TIMES DAILY
Qty: 20 TABLET | Refills: 0 | Status: SHIPPED | OUTPATIENT
Start: 2019-11-20 | End: 2020-05-04

## 2019-11-20 NOTE — PROGRESS NOTES
Subjective   Carol Cooley is a 74 y.o. female.     History of Present Illness     Her cat bit her right hand last night near her wrist when she was trying to put its shirt on. Is slightly swollen and warm, and a little tender. Her cat is uptodate on its rabies shot. No fever. No drainage      Current Outpatient Medications on File Prior to Visit   Medication Sig Dispense Refill   • ALPRAZolam (XANAX) 0.5 MG tablet Take 1 tablet by mouth 2 (Two) Times a Day As Needed for Anxiety or Sleep. 60 tablet 2   • buPROPion XL (WELLBUTRIN XL) 150 MG 24 hr tablet Take 1 tablet by mouth Daily. 30 tablet 11   • buPROPion XL (WELLBUTRIN XL) 300 MG 24 hr tablet Take 1 tablet by mouth Daily. 30 tablet 11   • clobetasol (TEMOVATE) 0.05 % ointment Apply  topically 2 (two) times a day.     • doxycycline (VIBRAMYCIN) 100 MG capsule Take 100 mg by mouth Daily.     • fluticasone-salmeterol (ADVAIR DISKUS) 250-50 MCG/DOSE DISKUS Inhale 1 puff 2 (two) times a day. 60 each 11   • gabapentin (NEURONTIN) 300 MG capsule Take 300 mg by mouth 4 (Four) Times a Day.     • hydroxychloroquine (PLAQUENIL) 200 MG tablet Take 200 mg by mouth daily.     • lactulose (CHRONULAC) 10 GM/15ML solution Take 30 mL by mouth 2 (Two) Times a Day. Start 30ml qd and can increase to bid if needed 946 mL 11   • levothyroxine (SYNTHROID, LEVOTHROID) 137 MCG tablet Take 1 tablet by mouth Daily. 30 tablet 11   • linaclotide (LINZESS) 290 MCG capsule capsule Take 1 capsule by mouth Every Morning Before Breakfast. 30 capsule 11   • meloxicam (MOBIC) 15 MG tablet 1 qd prn pain 30 tablet 5   • predniSONE (DELTASONE) 10 MG tablet 4qd x2d, then 3qd x2d, then 2qd x2d, then 1qd x2d 20 tablet 0     No current facility-administered medications on file prior to visit.        The following portions of the patient's history were reviewed and updated as appropriate: allergies, current medications, past family history, past medical history, past social history, past surgical  "history and problem list.    Review of Systems   Constitutional: Negative for fatigue, unexpected weight gain and unexpected weight loss.   Musculoskeletal: Positive for joint swelling.   Skin: Positive for color change.       Objective   /62 (BP Location: Left arm)   Pulse 89   Temp 97.9 °F (36.6 °C) (Temporal)   Ht 152.4 cm (60\")   Wt 79.7 kg (175 lb 12.8 oz)   SpO2 98%   BMI 34.33 kg/m²   Physical Exam   Constitutional: She is oriented to person, place, and time. She appears well-developed and well-nourished. No distress.   HENT:   Head: Normocephalic and atraumatic.   Nose: Nose normal.   Eyes: Conjunctivae and EOM are normal. Pupils are equal, round, and reactive to light. Right eye exhibits no discharge. Left eye exhibits no discharge. No scleral icterus.   Musculoskeletal: She exhibits tenderness (over r dorsum of hand close to wrist. 4 small puncture  marks. slight erythema. no drainage).   Neurological: She is alert and oriented to person, place, and time. No cranial nerve deficit.   Skin: Skin is warm and dry. No rash noted. She is not diaphoretic. No erythema.   Psychiatric: She has a normal mood and affect. Her behavior is normal. Judgment and thought content normal.   Nursing note and vitals reviewed.        Assessment/Plan   Carol was seen today for animal bite.    Diagnoses and all orders for this visit:    Cat bite, initial encounter - we will go ahead and cover w/ antibiotics and give repeat dT since lat was 5 yrs ago. She will call if worsens  -     amoxicillin-clavulanate (AUGMENTIN) 875-125 MG per tablet; Take 1 tablet by mouth 2 (Two) Times a Day.  -     Td Vaccine Greater Than or Equal To 6yo Preservative Free IM    Other superficial bite of right wrist, initial encounter   -     Td Vaccine Greater Than or Equal To 6yo Preservative Free IM               "

## 2020-01-19 DIAGNOSIS — G47.09 OTHER INSOMNIA: ICD-10-CM

## 2020-01-20 RX ORDER — ALPRAZOLAM 0.5 MG/1
TABLET ORAL
Qty: 60 TABLET | Refills: 0 | Status: SHIPPED | OUTPATIENT
Start: 2020-01-20 | End: 2020-03-17

## 2020-01-20 NOTE — TELEPHONE ENCOUNTER
She was last here in November and her last destin was in August.  If you want to fill I will run a destin.

## 2020-02-19 RX ORDER — MELOXICAM 15 MG/1
TABLET ORAL
Qty: 30 TABLET | Refills: 1 | Status: SHIPPED | OUTPATIENT
Start: 2020-02-19 | End: 2020-04-20

## 2020-03-17 DIAGNOSIS — G47.09 OTHER INSOMNIA: ICD-10-CM

## 2020-03-17 RX ORDER — LEVOTHYROXINE SODIUM 137 UG/1
TABLET ORAL
Qty: 30 TABLET | Refills: 10 | Status: SHIPPED | OUTPATIENT
Start: 2020-03-17 | End: 2020-06-24

## 2020-03-17 RX ORDER — ALPRAZOLAM 0.5 MG/1
TABLET ORAL
Qty: 30 TABLET | Refills: 0 | Status: SHIPPED | OUTPATIENT
Start: 2020-03-17 | End: 2020-05-04 | Stop reason: SDUPTHER

## 2020-03-17 RX ORDER — BUPROPION HYDROCHLORIDE 300 MG/1
TABLET ORAL
Qty: 30 TABLET | Refills: 10 | Status: SHIPPED | OUTPATIENT
Start: 2020-03-17 | End: 2020-06-24

## 2020-03-17 RX ORDER — BUPROPION HYDROCHLORIDE 150 MG/1
TABLET ORAL
Qty: 30 TABLET | Refills: 10 | Status: SHIPPED | OUTPATIENT
Start: 2020-03-17 | End: 2020-06-24

## 2020-03-17 NOTE — TELEPHONE ENCOUNTER
Called and fixed prescriptions at the pharmacy as directed. Pharmacy tech verbalized understanding and had no further questions.

## 2020-04-20 RX ORDER — MELOXICAM 15 MG/1
TABLET ORAL
Qty: 30 TABLET | Refills: 0 | Status: SHIPPED | OUTPATIENT
Start: 2020-04-20 | End: 2020-05-21

## 2020-05-04 ENCOUNTER — E-VISIT (OUTPATIENT)
Dept: INTERNAL MEDICINE | Facility: CLINIC | Age: 75
End: 2020-05-04

## 2020-05-04 DIAGNOSIS — F41.9 ANXIETY: ICD-10-CM

## 2020-05-04 DIAGNOSIS — J30.2 SEASONAL ALLERGIC RHINITIS, UNSPECIFIED TRIGGER: Primary | ICD-10-CM

## 2020-05-04 DIAGNOSIS — F51.01 PRIMARY INSOMNIA: ICD-10-CM

## 2020-05-04 PROCEDURE — 99423 OL DIG E/M SVC 21+ MIN: CPT | Performed by: INTERNAL MEDICINE

## 2020-05-04 RX ORDER — ALBUTEROL SULFATE 90 UG/1
2 AEROSOL, METERED RESPIRATORY (INHALATION) EVERY 4 HOURS PRN
Qty: 1 INHALER | Refills: 11 | Status: SHIPPED | OUTPATIENT
Start: 2020-05-04

## 2020-05-04 RX ORDER — ALPRAZOLAM 0.5 MG/1
TABLET ORAL
Qty: 60 TABLET | Refills: 0 | Status: SHIPPED | OUTPATIENT
Start: 2020-05-04 | End: 2020-06-26 | Stop reason: SDUPTHER

## 2020-05-04 NOTE — PROGRESS NOTES
Subjective   Carol Cooley is a 75 y.o. female.     History of Present Illness   This visit was an Evisit because of the COVID-19 pandemic. Total visit time was approximately 21 minutes    Cough-she has had a cough for over a week; it is productive of clear sputum.  No fever.  No COVID-19 exposures.  No change in smell or taste.  She also has some sneezing, eyes itching, runny nose, and wheezing.  She does have a history of allergies, and she feels like her symptoms are due to springtime allergies.  She has been trying Mucinex DM, inhaler, and cough drops.  She does use Benadryl as needed but makes her drowsy so cannot use every day.  She uses the Advair about 2 times a week and does help.  She does feel like she is wheezing but denies any shortness of breath or respiratory distress.  She does not feel like she has an upper respiratory infection.  She is a smoker    Anxiety/insomnia-she also needs a refill on Xanax.  She uses this as needed and does help.  Does not use every day    Current Outpatient Medications on File Prior to Visit   Medication Sig Dispense Refill   • ALPRAZolam (XANAX) 0.5 MG tablet TAKE ONE TABLET BY MOUTH TWICE A DAY AS NEEDED FOR ANXIETY OR SLEEP 30 tablet 0   • amoxicillin-clavulanate (AUGMENTIN) 875-125 MG per tablet Take 1 tablet by mouth 2 (Two) Times a Day. 20 tablet 0   • buPROPion XL (WELLBUTRIN XL) 150 MG 24 hr tablet TAKE ONE TABLET BY MOUTH DAILY 30 tablet 10   • buPROPion XL (WELLBUTRIN XL) 300 MG 24 hr tablet TAKE ONE TABLET BY MOUTH DAILY 30 tablet 10   • clobetasol (TEMOVATE) 0.05 % ointment Apply  topically 2 (two) times a day.     • fluticasone-salmeterol (ADVAIR DISKUS) 250-50 MCG/DOSE DISKUS Inhale 1 puff 2 (Two) Times a Day. 60 each 11   • gabapentin (NEURONTIN) 300 MG capsule Take 300 mg by mouth 4 (Four) Times a Day.     • hydroxychloroquine (PLAQUENIL) 200 MG tablet Take 200 mg by mouth daily.     • levothyroxine (SYNTHROID, LEVOTHROID) 137 MCG tablet TAKE ONE TABLET  BY MOUTH DAILY 30 tablet 10   • meloxicam (MOBIC) 15 MG tablet TAKE ONE TABLET BY MOUTH DAILY AS NEEDED FOR PAIN 30 tablet 0     No current facility-administered medications on file prior to visit.        The following portions of the patient's history were reviewed and updated as appropriate: allergies, current medications, past family history, past medical history, past social history, past surgical history and problem list.    Review of Systems   General-no fever, no COVID-19 exposure  Allergy/immunology-positive for seasonal allergies, no immune deficiency  ENT-runny nose, itchy eyes, sore throat, sneezing  Cardiac - no CP  Pulmonary-cough, wheezing, no respiratory distress, very mild shortness of breath  Psych: sleep disturbance - xanax helps      Objective   There were no vitals taken for this visit.      Assessment/Plan   Diagnoses and all orders for this visit:    Seasonal allergic rhinitis, unspecified trigger-we will have her try daily Claritin and add albuterol as needed for wheezing.  She could also use the Advair more frequently since she is only using it about twice a week.  Call if symptoms worsen we can see her in the office.  -     albuterol sulfate  (90 Base) MCG/ACT inhaler; Inhale 2 puffs Every 4 (Four) Hours As Needed for Wheezing.    Primary insomnia/anxiety-stable on as needed Xanax.  Patient has been on the same dose for many years.Pt has already signed controlled substance contract. Navneet is pending. UDS due 8/20  -     ALPRAZolam (XANAX) 0.5 MG tablet; 1 bid prn        Preventative-we will try to get her wellness exam moved up , and she is due for her blood work for her thyroid as well

## 2020-05-19 ENCOUNTER — TELEPHONE (OUTPATIENT)
Dept: INTERNAL MEDICINE | Facility: CLINIC | Age: 75
End: 2020-05-19

## 2020-05-19 DIAGNOSIS — I10 BENIGN ESSENTIAL HYPERTENSION: ICD-10-CM

## 2020-05-19 DIAGNOSIS — E55.9 VITAMIN D DEFICIENCY: ICD-10-CM

## 2020-05-19 DIAGNOSIS — K59.09 CHRONIC CONSTIPATION: ICD-10-CM

## 2020-05-19 DIAGNOSIS — E03.9 ACQUIRED HYPOTHYROIDISM: Primary | ICD-10-CM

## 2020-05-19 DIAGNOSIS — E53.8 B12 DEFICIENCY: ICD-10-CM

## 2020-05-19 NOTE — TELEPHONE ENCOUNTER
Patient wishes to keep her medicare wellness appointment on 6/29 and would like to go on and have labs drawn. Can you place order for her labs please. There was no available time for medicare wellness to be done 5/20

## 2020-05-21 RX ORDER — MELOXICAM 15 MG/1
TABLET ORAL
Qty: 30 TABLET | Refills: 1 | Status: SHIPPED | OUTPATIENT
Start: 2020-05-21 | End: 2020-07-09 | Stop reason: SDUPTHER

## 2020-05-28 ENCOUNTER — LAB (OUTPATIENT)
Dept: LAB | Facility: HOSPITAL | Age: 75
End: 2020-05-28

## 2020-05-28 DIAGNOSIS — E53.8 B12 DEFICIENCY: ICD-10-CM

## 2020-05-28 DIAGNOSIS — E03.9 ACQUIRED HYPOTHYROIDISM: ICD-10-CM

## 2020-05-28 DIAGNOSIS — I10 BENIGN ESSENTIAL HYPERTENSION: ICD-10-CM

## 2020-05-28 DIAGNOSIS — E55.9 VITAMIN D DEFICIENCY: ICD-10-CM

## 2020-05-28 LAB
25(OH)D3+25(OH)D2 SERPL-MCNC: 86.1 NG/ML (ref 30–100)
ALBUMIN SERPL-MCNC: 4.4 G/DL (ref 3.5–5.2)
ALBUMIN/GLOB SERPL: 2 G/DL
ALP SERPL-CCNC: 60 U/L (ref 39–117)
ALT SERPL-CCNC: 28 U/L (ref 1–33)
AST SERPL-CCNC: 26 U/L (ref 1–32)
BASOPHILS # BLD AUTO: 0.1 10*3/MM3 (ref 0–0.2)
BASOPHILS NFR BLD AUTO: 1.1 % (ref 0–1.5)
BILIRUB SERPL-MCNC: 0.4 MG/DL (ref 0.2–1.2)
BUN SERPL-MCNC: 22 MG/DL (ref 8–23)
BUN/CREAT SERPL: 23.7 (ref 7–25)
CALCIUM SERPL-MCNC: 9.3 MG/DL (ref 8.6–10.5)
CHLORIDE SERPL-SCNC: 109 MMOL/L (ref 98–107)
CHOLEST SERPL-MCNC: 154 MG/DL (ref 0–200)
CO2 SERPL-SCNC: 22.8 MMOL/L (ref 22–29)
CREAT SERPL-MCNC: 0.93 MG/DL (ref 0.57–1)
EOSINOPHIL # BLD AUTO: 0.31 10*3/MM3 (ref 0–0.4)
EOSINOPHIL NFR BLD AUTO: 3.3 % (ref 0.3–6.2)
ERYTHROCYTE [DISTWIDTH] IN BLOOD BY AUTOMATED COUNT: 11.7 % (ref 12.3–15.4)
GLOBULIN SER CALC-MCNC: 2.2 GM/DL
GLUCOSE SERPL-MCNC: 90 MG/DL (ref 65–99)
HCT VFR BLD AUTO: 40.7 % (ref 34–46.6)
HDLC SERPL-MCNC: 49 MG/DL (ref 40–60)
HGB BLD-MCNC: 14.4 G/DL (ref 12–15.9)
IMM GRANULOCYTES # BLD AUTO: 0.03 10*3/MM3 (ref 0–0.05)
IMM GRANULOCYTES NFR BLD AUTO: 0.3 % (ref 0–0.5)
LDLC SERPL CALC-MCNC: 92 MG/DL (ref 0–100)
LYMPHOCYTES # BLD AUTO: 2.03 10*3/MM3 (ref 0.7–3.1)
LYMPHOCYTES NFR BLD AUTO: 21.3 % (ref 19.6–45.3)
MCH RBC QN AUTO: 31.5 PG (ref 26.6–33)
MCHC RBC AUTO-ENTMCNC: 35.4 G/DL (ref 31.5–35.7)
MCV RBC AUTO: 89.1 FL (ref 79–97)
MONOCYTES # BLD AUTO: 0.64 10*3/MM3 (ref 0.1–0.9)
MONOCYTES NFR BLD AUTO: 6.7 % (ref 5–12)
NEUTROPHILS # BLD AUTO: 6.41 10*3/MM3 (ref 1.7–7)
NEUTROPHILS NFR BLD AUTO: 67.3 % (ref 42.7–76)
NRBC BLD AUTO-RTO: 0 /100 WBC (ref 0–0.2)
PLATELET # BLD AUTO: 200 10*3/MM3 (ref 140–450)
POTASSIUM SERPL-SCNC: 4.4 MMOL/L (ref 3.5–5.2)
PROT SERPL-MCNC: 6.6 G/DL (ref 6–8.5)
RBC # BLD AUTO: 4.57 10*6/MM3 (ref 3.77–5.28)
SODIUM SERPL-SCNC: 142 MMOL/L (ref 136–145)
TRIGL SERPL-MCNC: 67 MG/DL (ref 0–150)
TSH SERPL DL<=0.005 MIU/L-ACNC: 2.23 UIU/ML (ref 0.27–4.2)
VIT B12 SERPL-MCNC: >2000 PG/ML (ref 211–946)
VLDLC SERPL CALC-MCNC: 13.4 MG/DL (ref 5–40)
WBC # BLD AUTO: 9.52 10*3/MM3 (ref 3.4–10.8)

## 2020-06-24 RX ORDER — LEVOTHYROXINE SODIUM 137 UG/1
TABLET ORAL
Qty: 30 TABLET | Refills: 1 | Status: SHIPPED | OUTPATIENT
Start: 2020-06-24 | End: 2020-08-24

## 2020-06-24 RX ORDER — BUPROPION HYDROCHLORIDE 150 MG/1
TABLET ORAL
Qty: 30 TABLET | Refills: 1 | Status: SHIPPED | OUTPATIENT
Start: 2020-06-24 | End: 2020-08-24

## 2020-06-24 RX ORDER — BUPROPION HYDROCHLORIDE 300 MG/1
TABLET ORAL
Qty: 30 TABLET | Refills: 1 | Status: SHIPPED | OUTPATIENT
Start: 2020-06-24 | End: 2020-08-24

## 2020-06-26 ENCOUNTER — OFFICE VISIT (OUTPATIENT)
Dept: INTERNAL MEDICINE | Facility: CLINIC | Age: 75
End: 2020-06-26

## 2020-06-26 VITALS
TEMPERATURE: 98.2 F | HEIGHT: 60 IN | HEART RATE: 72 BPM | SYSTOLIC BLOOD PRESSURE: 116 MMHG | OXYGEN SATURATION: 99 % | RESPIRATION RATE: 16 BRPM | DIASTOLIC BLOOD PRESSURE: 76 MMHG | BODY MASS INDEX: 40.17 KG/M2 | WEIGHT: 204.6 LBS

## 2020-06-26 DIAGNOSIS — Z00.00 ENCOUNTER FOR MEDICARE ANNUAL WELLNESS EXAM: Primary | ICD-10-CM

## 2020-06-26 DIAGNOSIS — Z87.891 PERSONAL HISTORY OF TOBACCO USE, PRESENTING HAZARDS TO HEALTH: ICD-10-CM

## 2020-06-26 DIAGNOSIS — F51.01 PRIMARY INSOMNIA: ICD-10-CM

## 2020-06-26 DIAGNOSIS — Z78.0 POSTMENOPAUSAL: ICD-10-CM

## 2020-06-26 DIAGNOSIS — Z00.00 ROUTINE GENERAL MEDICAL EXAMINATION AT A HEALTH CARE FACILITY: ICD-10-CM

## 2020-06-26 DIAGNOSIS — E03.9 ACQUIRED HYPOTHYROIDISM: ICD-10-CM

## 2020-06-26 DIAGNOSIS — I10 BENIGN ESSENTIAL HYPERTENSION: ICD-10-CM

## 2020-06-26 DIAGNOSIS — F41.9 ANXIETY: ICD-10-CM

## 2020-06-26 DIAGNOSIS — Z12.11 COLON CANCER SCREENING: ICD-10-CM

## 2020-06-26 DIAGNOSIS — Z72.0 TOBACCO ABUSE: ICD-10-CM

## 2020-06-26 LAB
BILIRUB BLD-MCNC: NEGATIVE MG/DL
CLARITY, POC: CLEAR
COLOR UR: YELLOW
GLUCOSE UR STRIP-MCNC: NEGATIVE MG/DL
KETONES UR QL: NEGATIVE
LEUKOCYTE EST, POC: NEGATIVE
NITRITE UR-MCNC: NEGATIVE MG/ML
PH UR: 6 [PH] (ref 5–8)
PROT UR STRIP-MCNC: NEGATIVE MG/DL
RBC # UR STRIP: NEGATIVE /UL
SP GR UR: 1.01 (ref 1–1.03)
UROBILINOGEN UR QL: NORMAL

## 2020-06-26 PROCEDURE — 96160 PT-FOCUSED HLTH RISK ASSMT: CPT | Performed by: INTERNAL MEDICINE

## 2020-06-26 PROCEDURE — 81003 URINALYSIS AUTO W/O SCOPE: CPT | Performed by: INTERNAL MEDICINE

## 2020-06-26 PROCEDURE — 99397 PER PM REEVAL EST PAT 65+ YR: CPT | Performed by: INTERNAL MEDICINE

## 2020-06-26 PROCEDURE — G0439 PPPS, SUBSEQ VISIT: HCPCS | Performed by: INTERNAL MEDICINE

## 2020-06-26 RX ORDER — LORATADINE 10 MG/1
1 CAPSULE, LIQUID FILLED ORAL DAILY
COMMUNITY
Start: 2020-05-04 | End: 2020-12-23 | Stop reason: ALTCHOICE

## 2020-06-26 RX ORDER — ALPRAZOLAM 0.5 MG/1
TABLET ORAL
Qty: 60 TABLET | Refills: 2 | Status: SHIPPED | OUTPATIENT
Start: 2020-06-26 | End: 2020-12-18 | Stop reason: SDUPTHER

## 2020-06-26 NOTE — PROGRESS NOTES
History of Present Illness     Here for medicare wellness exam and physical. No hospitalizations in last year and pt feels health is stable    specialists:  derm - Jaquelin  uro - [facility]  gi -[facility]  Eye - high  living will - has   depression screen neg - 0  Fall risk - yes- tripped, generally care  Memory screen - neg  d daily, probiotc airborne, apple cider vinegar  Exercise - had been walking but not as much  Diet -trying to eat healthy, but still lke sweets and bread, cut back on b12 and D since these were high     Smoking about 10 cigs a day currently      Anxiety - takes xanax 1-2 Day generally. No problems w/ it.  .      Constipation-we tried Truelance but did not help.  We also tried amities and Linzess, but these did not help.  She has tried Metamucil and MOM - helped initially, but then did not. Has tried Mg citrate - didn't help. She takes the dulcolax 3 twice a week and will have BMs the day that she takes it. Drinks plenty of water a day    Cough -will use advair when her allergies are acting up, and will use albuterol if needed. Last used about 10 days ago. She also uses Claritin daily    No utis in 2 years    Current Outpatient Medications on File Prior to Visit   Medication Sig Dispense Refill   • albuterol sulfate  (90 Base) MCG/ACT inhaler Inhale 2 puffs Every 4 (Four) Hours As Needed for Wheezing. 1 inhaler 11   • ALPRAZolam (XANAX) 0.5 MG tablet 1 bid prn 60 tablet 0   • buPROPion XL (WELLBUTRIN XL) 150 MG 24 hr tablet TAKE ONE TABLET BY MOUTH DAILY 30 tablet 1   • buPROPion XL (WELLBUTRIN XL) 300 MG 24 hr tablet TAKE ONE TABLET BY MOUTH DAILY 30 tablet 1   • clobetasol (TEMOVATE) 0.05 % ointment Apply  topically 2 (two) times a day.     • fluticasone-salmeterol (ADVAIR DISKUS) 250-50 MCG/DOSE DISKUS Inhale 1 puff 2 (Two) Times a Day. 60 each 11   • gabapentin (NEURONTIN) 300 MG capsule Take 300 mg by mouth 4 (Four) Times a Day.     • hydroxychloroquine (PLAQUENIL) 200 MG tablet  Take 200 mg by mouth daily.     • levothyroxine (SYNTHROID, LEVOTHROID) 137 MCG tablet TAKE ONE TABLET BY MOUTH DAILY 30 tablet 1   • meloxicam (MOBIC) 15 MG tablet TAKE ONE TABLET BY MOUTH DAILY AS NEEDED FOR PAIN 30 tablet 1     No current facility-administered medications on file prior to visit.        The following portions of the patient's history were reviewed and updated as appropriate: allergies, current medications, past family history, past medical history, past social history, past surgical history and problem list.    Review of Systems   Constitutional: Positive for activity change and unexpected weight gain. Negative for appetite change, fever and unexpected weight loss.   HENT: Negative.    Eyes: Negative.    Respiratory: Positive for cough and shortness of breath (just occasionally uses advair when her allergeies are acting up). Negative for wheezing.    Cardiovascular: Negative for chest pain, palpitations and leg swelling.   Gastrointestinal: Positive for constipation (has been better recently - goes every 1-2 days. metamucil didnt help. is using probitoic).   Endocrine: Negative.    Genitourinary: Negative for difficulty urinating and dysuria.        No urinary symptoms recently   Skin: Positive for rash (lichen simplex).   Allergic/Immunologic: Positive for environmental allergies. Negative for immunocompromised state.   Neurological: Negative for seizures, speech difficulty, memory problem and confusion.   Hematological: Does not bruise/bleed easily.   Psychiatric/Behavioral: Negative for agitation.         Objective    Vitals:    06/26/20 1004   BP: 116/76   Pulse: 72   Resp: 16   Temp: 98.2 °F (36.8 °C)   SpO2: 99%     Physical Exam   Physical Exam   Constitutional: She is oriented to person, place, and time. She appears well-developed and well-nourished. No distress.   HENT:   Head: Normocephalic and atraumatic.   Right Ear: External ear normal.   Left Ear: External ear normal.   Nose: Nose  normal.   Mouth/Throat: Oropharynx is clear and moist. No oropharyngeal exudate.   Eyes: Pupils are equal, round, and reactive to light. Conjunctivae and EOM are normal. Right eye exhibits no discharge. Left eye exhibits no discharge. No scleral icterus.   Neck: Normal range of motion. Neck supple. No thyromegaly present.   Cardiovascular: Normal rate, regular rhythm, normal heart sounds and intact distal pulses. Exam reveals no gallop and no friction rub.   No murmur heard.  Pulmonary/Chest: Effort normal and breath sounds normal. No respiratory distress. She has no wheezes. She has no rales. Right breast exhibits no mass, no nipple discharge, no skin change and no tenderness. Left breast exhibits no mass, no nipple discharge, no skin change and no tenderness.   Abdominal: Soft. Bowel sounds are normal. She exhibits no distension and no mass. There is no tenderness. There is no rebound and no guarding.   Musculoskeletal: Normal range of motion. She exhibits no edema or deformity.   Lymphadenopathy:     She has no cervical adenopathy.   Neurological: She is alert and oriented to person, place, and time. She displays normal reflexes. Coordination normal.   Skin: Skin is warm and dry. No rash noted. She is not diaphoretic. No erythema. No pallor.   Psychiatric: She has a normal mood and affect. Her behavior is normal. Judgment and thought content normal.   Nursing note and vitals reviewed.         Assessment/Plan   Carol was seen today for medicare wellness-subsequent and annual exam.    Diagnoses and all orders for this visit:    Encounter for Medicare annual wellness exam/Routine general medical examination at a health care facility  -     POC Urinalysis Dipstick, Automated   Regular exercise/healthy diet. BSE q month. Sunscreen use encouraged. caclium intake reviewed.  Living will - pt has- bring copy here  Todd due now- she has scheduled for 8/20  Colon due now (h/o polyps, [facility])- will schedule  Pneumovax -  had  prevnar - had  DT due 1/24  DEXA due now  Shingles - shingrix discussed -  can check at pharmacy since we do not have   Does not need paps since age 65 or older and last 2 paps were normal, and HPV was always neg  We did her labs in May so we will plan to recheck this winter  Will schedule CT lung cancer screen    Tobacco abuse - smoking cessation encouraged. Schedule ct scan      Benign essential hypertension- good control overal    Anxiety- xanax refilled. Pt has already signed controlled substance contract. Navneet done today and appropriate. UDS due 8/20    Acquired hypothyroidism- will recheck in 6m    Colon cancer screening- will schedule  -     Ambulatory Referral For Screening Colonoscopy

## 2020-07-06 ENCOUNTER — HOSPITAL ENCOUNTER (OUTPATIENT)
Dept: CT IMAGING | Facility: HOSPITAL | Age: 75
Discharge: HOME | End: 2020-07-06
Admitting: INTERNAL MEDICINE

## 2020-07-06 DIAGNOSIS — Z87.891 PERSONAL HISTORY OF TOBACCO USE, PRESENTING HAZARDS TO HEALTH: ICD-10-CM

## 2020-07-06 PROCEDURE — G0297 LDCT FOR LUNG CA SCREEN: HCPCS

## 2020-07-09 DIAGNOSIS — F41.9 ANXIETY: ICD-10-CM

## 2020-07-09 DIAGNOSIS — F51.01 PRIMARY INSOMNIA: ICD-10-CM

## 2020-07-10 RX ORDER — ALPRAZOLAM 0.5 MG/1
TABLET ORAL
Qty: 60 TABLET | Refills: 2 | OUTPATIENT
Start: 2020-07-10

## 2020-07-10 RX ORDER — MELOXICAM 15 MG/1
15 TABLET ORAL DAILY PRN
Qty: 30 TABLET | Refills: 5 | Status: SHIPPED | OUTPATIENT
Start: 2020-07-10 | End: 2021-01-28

## 2020-08-24 RX ORDER — BUPROPION HYDROCHLORIDE 150 MG/1
TABLET ORAL
Qty: 30 TABLET | Refills: 11 | Status: SHIPPED | OUTPATIENT
Start: 2020-08-24 | End: 2021-04-02 | Stop reason: SDUPTHER

## 2020-08-24 RX ORDER — LEVOTHYROXINE SODIUM 137 UG/1
TABLET ORAL
Qty: 30 TABLET | Refills: 3 | Status: SHIPPED | OUTPATIENT
Start: 2020-08-24 | End: 2020-12-29 | Stop reason: SDUPTHER

## 2020-08-24 RX ORDER — BUPROPION HYDROCHLORIDE 300 MG/1
TABLET ORAL
Qty: 30 TABLET | Refills: 11 | Status: SHIPPED | OUTPATIENT
Start: 2020-08-24 | End: 2021-04-02 | Stop reason: SDUPTHER

## 2020-09-22 ENCOUNTER — CLINICAL SUPPORT (OUTPATIENT)
Dept: INTERNAL MEDICINE | Facility: CLINIC | Age: 75
End: 2020-09-22

## 2020-09-22 DIAGNOSIS — Z23 NEED FOR INFLUENZA VACCINATION: ICD-10-CM

## 2020-09-22 PROCEDURE — 90694 VACC AIIV4 NO PRSRV 0.5ML IM: CPT | Performed by: INTERNAL MEDICINE

## 2020-09-22 PROCEDURE — G0008 ADMIN INFLUENZA VIRUS VAC: HCPCS | Performed by: INTERNAL MEDICINE

## 2020-12-18 ENCOUNTER — TELEPHONE (OUTPATIENT)
Dept: INTERNAL MEDICINE | Facility: CLINIC | Age: 75
End: 2020-12-18

## 2020-12-18 DIAGNOSIS — F51.01 PRIMARY INSOMNIA: ICD-10-CM

## 2020-12-18 DIAGNOSIS — F41.9 ANXIETY: ICD-10-CM

## 2020-12-18 RX ORDER — ALPRAZOLAM 0.5 MG/1
TABLET ORAL
Qty: 14 TABLET | Refills: 0 | Status: SHIPPED | OUTPATIENT
Start: 2020-12-18 | End: 2020-12-23 | Stop reason: SDUPTHER

## 2020-12-18 NOTE — TELEPHONE ENCOUNTER
----- Message from Briana Nelson MD sent at 12/17/2020  5:16 PM EST -----  Regarding: FW: Prescription Question  Contact: [phone]      ----- Message -----  From: Yolette Arvizu MA  Sent: 12/17/2020   4:50 PM EST  To: Briana Nelson MD  Subject: FW: Prescription Question                          ----- Message -----  From: Carol Cooley  Sent: 12/17/2020   3:52 PM EST  To: [facility]  Clinical Pool  Subject: Prescription Question                            I only have 3 Xanex left and my appointment isn't until the 30th.  Will you call in enough until I get there on the 30th.  Thank you.

## 2020-12-23 ENCOUNTER — OFFICE VISIT (OUTPATIENT)
Dept: INTERNAL MEDICINE | Facility: CLINIC | Age: 75
End: 2020-12-23

## 2020-12-23 VITALS
SYSTOLIC BLOOD PRESSURE: 138 MMHG | DIASTOLIC BLOOD PRESSURE: 80 MMHG | BODY MASS INDEX: 42.88 KG/M2 | WEIGHT: 218.4 LBS | HEIGHT: 60 IN | HEART RATE: 74 BPM | TEMPERATURE: 96.7 F | OXYGEN SATURATION: 99 %

## 2020-12-23 DIAGNOSIS — M85.89 OSTEOPENIA OF MULTIPLE SITES: ICD-10-CM

## 2020-12-23 DIAGNOSIS — F41.9 ANXIETY: Primary | ICD-10-CM

## 2020-12-23 DIAGNOSIS — E55.9 VITAMIN D DEFICIENCY: ICD-10-CM

## 2020-12-23 DIAGNOSIS — F51.01 PRIMARY INSOMNIA: ICD-10-CM

## 2020-12-23 DIAGNOSIS — Z79.899 HIGH RISK MEDICATIONS (NOT ANTICOAGULANTS) LONG-TERM USE: ICD-10-CM

## 2020-12-23 DIAGNOSIS — E03.9 ACQUIRED HYPOTHYROIDISM: ICD-10-CM

## 2020-12-23 DIAGNOSIS — E53.8 B12 DEFICIENCY: ICD-10-CM

## 2020-12-23 DIAGNOSIS — K59.09 CHRONIC CONSTIPATION: ICD-10-CM

## 2020-12-23 PROCEDURE — 99214 OFFICE O/P EST MOD 30 MIN: CPT | Performed by: INTERNAL MEDICINE

## 2020-12-23 RX ORDER — LEVOTHYROXINE SODIUM 137 UG/1
137 TABLET ORAL DAILY
Qty: 30 TABLET | Refills: 5 | Status: CANCELLED | OUTPATIENT
Start: 2020-12-23

## 2020-12-23 RX ORDER — ALPRAZOLAM 0.5 MG/1
TABLET ORAL
Qty: 60 TABLET | Refills: 2 | Status: SHIPPED | OUTPATIENT
Start: 2020-12-23 | End: 2021-04-08 | Stop reason: SDUPTHER

## 2020-12-23 RX ORDER — ALPRAZOLAM 0.5 MG/1
TABLET ORAL
Qty: 30 TABLET | Refills: 0 | Status: CANCELLED | OUTPATIENT
Start: 2020-12-23

## 2020-12-23 RX ORDER — FLUTICASONE PROPIONATE 50 MCG
2 SPRAY, SUSPENSION (ML) NASAL DAILY
Qty: 1 BOTTLE | Refills: 11 | Status: SHIPPED | OUTPATIENT
Start: 2020-12-23

## 2020-12-23 RX ORDER — FLUTICASONE PROPIONATE 50 MCG
2 SPRAY, SUSPENSION (ML) NASAL DAILY
COMMUNITY
End: 2020-12-23 | Stop reason: SDUPTHER

## 2020-12-23 NOTE — PROGRESS NOTES
Answers for HPI/ROS submitted by the patient on 12/19/2020   What is the primary reason for your visit?: Other  Please describe your symptoms.: 6 month recheck for RX  Have you had these symptoms before?: No  How long have you been having these symptoms?: 1-4 days  Please list any medications you are currently taking for this condition.: Xanex, synthroid, meloxicam, wellbutrin.  Please describe any probable cause for these symptoms. : 6 month recheck for RX.    Subjective   Carol Cooley is a 75 y.o. female.     History of Present Illness     Here for f/u on:    Anxiety - takes xanax 1-2 Day generally. No problems w/ it.  .     Tobacco abuse-smokes 10 cigarettes/day on average. Has been hard to cut back w/ the COVID-19    Constipation- Is doing better recently - going most days w/o any laxatives.  However, will have some fecal incontinence if she bends or lifts.   She is doing fiber gummy daily     Hypothyroid-she is on Synthroid 137 mcg and feels okay.  Weight is up some.    Osteopenia-her recent bone density shows FRAX for hip at 3%.  Not as active as she had been but is walking some.  She does take 1 vitamin D daily and get some dairy in her diet but not a lot    She is doing Bcomplex once a day      Current Outpatient Medications on File Prior to Visit   Medication Sig Dispense Refill   • albuterol sulfate  (90 Base) MCG/ACT inhaler Inhale 2 puffs Every 4 (Four) Hours As Needed for Wheezing. 1 inhaler 11   • ALPRAZolam (XANAX) 0.5 MG tablet 1 bid prn 14 tablet 0   • buPROPion XL (WELLBUTRIN XL) 150 MG 24 hr tablet TAKE ONE TABLET BY MOUTH DAILY 30 tablet 11   • buPROPion XL (WELLBUTRIN XL) 300 MG 24 hr tablet TAKE ONE TABLET BY MOUTH DAILY 30 tablet 11   • clobetasol (TEMOVATE) 0.05 % ointment Apply  topically 2 (two) times a day.     • fluticasone-salmeterol (ADVAIR DISKUS) 250-50 MCG/DOSE DISKUS Inhale 1 puff 2 (Two) Times a Day. 60 each 11   • gabapentin (NEURONTIN) 300 MG capsule Take 300 mg by  "mouth 4 (Four) Times a Day.     • hydroxychloroquine (PLAQUENIL) 200 MG tablet Take 200 mg by mouth daily.     • levothyroxine (SYNTHROID, LEVOTHROID) 137 MCG tablet TAKE ONE TABLET BY MOUTH DAILY 30 tablet 3   • Loratadine (Claritin) 10 MG capsule Take 1 capsule by mouth Daily.     • meloxicam (MOBIC) 15 MG tablet Take 1 tablet by mouth Daily As Needed for Moderate Pain . 30 tablet 5     No current facility-administered medications on file prior to visit.        The following portions of the patient's history were reviewed and updated as appropriate: allergies, current medications, past family history, past medical history, past social history, past surgical history and problem list.    Review of Systems   Constitutional: Positive for unexpected weight gain. Negative for activity change, appetite change, fatigue, fever and unexpected weight loss.   Gastrointestinal: Negative for constipation and diarrhea (.  Looser stool).   Allergic/Immunologic: Negative for immunocompromised state.   Neurological: Negative for seizures, speech difficulty and numbness.   Psychiatric/Behavioral: The patient is nervous/anxious.        Objective   /80 (BP Location: Right arm, Patient Position: Sitting)   Pulse 74   Temp 96.7 °F (35.9 °C) (Infrared)   Ht 152.4 cm (60\")   Wt 99.1 kg (218 lb 6.4 oz)   SpO2 99%   BMI 42.65 kg/m²   Physical Exam  Constitutional:       General: She is not in acute distress.     Appearance: Normal appearance. She is well-developed. She is obese. She is not diaphoretic.   HENT:      Head: Normocephalic and atraumatic.   Eyes:      Conjunctiva/sclera: Conjunctivae normal.   Cardiovascular:      Rate and Rhythm: Normal rate and regular rhythm.      Heart sounds: Normal heart sounds. No murmur. No friction rub. No gallop.    Pulmonary:      Effort: Pulmonary effort is normal. No respiratory distress.      Breath sounds: Normal breath sounds. No wheezing.   Skin:     General: Skin is warm and dry. "   Neurological:      Mental Status: She is alert and oriented to person, place, and time.   Psychiatric:         Behavior: Behavior normal.         Thought Content: Thought content normal.         Judgment: Judgment normal.           Assessment/Plan   Diagnoses and all orders for this visit:    1. Anxiety (Primary)- stable on xanax prn. Will refill today. Pt has already signed controlled substance contract. Navneet done today and appropriate. UDS due today  -     Urine Drug Screen - Urine, Clean Catch    2. Acquired hypothyroidism- check today  -     TSH; Future    3. Chronic constipation-resolved but now some stool leakage.  We discussed going up on her fiber supplement and also doing Kegel exercises and yoga to improve pelvic floor    4. High risk medications (not anticoagulants) long-term use  -     CBC (No Diff); Future  -     Comprehensive Metabolic Panel; Future  -     Urine Drug Screen - Urine, Clean Catch    5. Osteopenia of multiple sites- we discussed fosamax but she declines. Encouraged weight bearing exercise. She is on D and gets some calcium in diet (handout on calcium intake given to her)    6. Primary insomnia- stable on xanax    7. Vitamin D deficiency-recheck today  -     Vitamin D 25 Hydroxy; Future    8. B12 deficiency-recheck today  -     Vitamin B12; Future        Smoking cessation encouraged

## 2020-12-28 ENCOUNTER — LAB (OUTPATIENT)
Dept: LAB | Facility: HOSPITAL | Age: 75
End: 2020-12-28

## 2020-12-28 DIAGNOSIS — E53.8 B12 DEFICIENCY: ICD-10-CM

## 2020-12-28 DIAGNOSIS — E55.9 VITAMIN D DEFICIENCY: ICD-10-CM

## 2020-12-28 DIAGNOSIS — Z79.899 HIGH RISK MEDICATIONS (NOT ANTICOAGULANTS) LONG-TERM USE: ICD-10-CM

## 2020-12-28 DIAGNOSIS — E03.9 ACQUIRED HYPOTHYROIDISM: ICD-10-CM

## 2020-12-28 LAB
25(OH)D3 SERPL-MCNC: 65.8 NG/ML (ref 30–100)
BASOPHILS # BLD AUTO: 0.11 10*3/MM3 (ref 0–0.2)
BASOPHILS NFR BLD AUTO: 1.3 % (ref 0–1.5)
DEPRECATED RDW RBC AUTO: 39.7 FL (ref 37–54)
EOSINOPHIL # BLD AUTO: 0.37 10*3/MM3 (ref 0–0.4)
EOSINOPHIL NFR BLD AUTO: 4.3 % (ref 0.3–6.2)
ERYTHROCYTE [DISTWIDTH] IN BLOOD BY AUTOMATED COUNT: 12 % (ref 12.3–15.4)
HCT VFR BLD AUTO: 41.7 % (ref 34–46.6)
HGB BLD-MCNC: 14.3 G/DL (ref 12–15.9)
IMM GRANULOCYTES # BLD AUTO: 0.03 10*3/MM3 (ref 0–0.05)
IMM GRANULOCYTES NFR BLD AUTO: 0.3 % (ref 0–0.5)
LYMPHOCYTES # BLD AUTO: 2.15 10*3/MM3 (ref 0.7–3.1)
LYMPHOCYTES NFR BLD AUTO: 24.9 % (ref 19.6–45.3)
MCH RBC QN AUTO: 31.1 PG (ref 26.6–33)
MCHC RBC AUTO-ENTMCNC: 34.3 G/DL (ref 31.5–35.7)
MCV RBC AUTO: 90.7 FL (ref 79–97)
MONOCYTES # BLD AUTO: 0.52 10*3/MM3 (ref 0.1–0.9)
MONOCYTES NFR BLD AUTO: 6 % (ref 5–12)
NEUTROPHILS NFR BLD AUTO: 5.47 10*3/MM3 (ref 1.7–7)
NEUTROPHILS NFR BLD AUTO: 63.2 % (ref 42.7–76)
NRBC BLD AUTO-RTO: 0 /100 WBC (ref 0–0.2)
PLATELET # BLD AUTO: 247 10*3/MM3 (ref 140–450)
PMV BLD AUTO: 9.5 FL (ref 6–12)
RBC # BLD AUTO: 4.6 10*6/MM3 (ref 3.77–5.28)
VIT B12 BLD-MCNC: 1419 PG/ML (ref 211–946)
WBC # BLD AUTO: 8.65 10*3/MM3 (ref 3.4–10.8)

## 2020-12-28 PROCEDURE — 80053 COMPREHEN METABOLIC PANEL: CPT

## 2020-12-28 PROCEDURE — 84443 ASSAY THYROID STIM HORMONE: CPT

## 2020-12-28 PROCEDURE — 36415 COLL VENOUS BLD VENIPUNCTURE: CPT

## 2020-12-28 PROCEDURE — 85025 COMPLETE CBC W/AUTO DIFF WBC: CPT

## 2020-12-28 PROCEDURE — 82607 VITAMIN B-12: CPT

## 2020-12-28 PROCEDURE — 80307 DRUG TEST PRSMV CHEM ANLYZR: CPT | Performed by: INTERNAL MEDICINE

## 2020-12-28 PROCEDURE — 82306 VITAMIN D 25 HYDROXY: CPT

## 2020-12-29 LAB
ALBUMIN SERPL-MCNC: 4.3 G/DL (ref 3.5–5.2)
ALBUMIN/GLOB SERPL: 1.8 G/DL
ALP SERPL-CCNC: 65 U/L (ref 39–117)
ALT SERPL W P-5'-P-CCNC: 28 U/L (ref 1–33)
ANION GAP SERPL CALCULATED.3IONS-SCNC: 11.4 MMOL/L (ref 5–15)
AST SERPL-CCNC: 28 U/L (ref 1–32)
BILIRUB SERPL-MCNC: 0.3 MG/DL (ref 0–1.2)
BUN SERPL-MCNC: 18 MG/DL (ref 8–23)
BUN/CREAT SERPL: 23.4 (ref 7–25)
CALCIUM SPEC-SCNC: 9.4 MG/DL (ref 8.6–10.5)
CHLORIDE SERPL-SCNC: 108 MMOL/L (ref 98–107)
CO2 SERPL-SCNC: 23.6 MMOL/L (ref 22–29)
CREAT SERPL-MCNC: 0.77 MG/DL (ref 0.57–1)
GFR SERPL CREATININE-BSD FRML MDRD: 73 ML/MIN/1.73
GLOBULIN UR ELPH-MCNC: 2.4 GM/DL
GLUCOSE SERPL-MCNC: 90 MG/DL (ref 65–99)
POTASSIUM SERPL-SCNC: 4.6 MMOL/L (ref 3.5–5.2)
PROT SERPL-MCNC: 6.7 G/DL (ref 6–8.5)
SODIUM SERPL-SCNC: 143 MMOL/L (ref 136–145)
TSH SERPL DL<=0.05 MIU/L-ACNC: 2.41 UIU/ML (ref 0.27–4.2)

## 2020-12-29 RX ORDER — LEVOTHYROXINE SODIUM 137 UG/1
137 TABLET ORAL DAILY
Qty: 30 TABLET | Refills: 11 | Status: SHIPPED | OUTPATIENT
Start: 2020-12-29 | End: 2021-04-02 | Stop reason: SDUPTHER

## 2020-12-30 LAB
AMPHETAMINES UR QL SCN: NEGATIVE NG/ML
BARBITURATES UR QL SCN: NEGATIVE NG/ML
BENZODIAZ UR QL SCN: POSITIVE NG/ML
BZE UR QL SCN: NEGATIVE NG/ML
CANNABINOIDS UR QL SCN: NEGATIVE NG/ML
CREAT UR-MCNC: 37.8 MG/DL (ref 20–300)
LABORATORY COMMENT REPORT: ABNORMAL
METHADONE UR QL SCN: NEGATIVE NG/ML
OPIATES UR QL SCN: NEGATIVE NG/ML
OXYCODONE+OXYMORPHONE UR QL SCN: NEGATIVE NG/ML
PCP UR QL: NEGATIVE NG/ML
PH UR: 5.3 [PH] (ref 4.5–8.9)
PROPOXYPH UR QL SCN: NEGATIVE NG/ML

## 2021-01-28 RX ORDER — MELOXICAM 15 MG/1
TABLET ORAL
Qty: 30 TABLET | Refills: 6 | Status: SHIPPED | OUTPATIENT
Start: 2021-01-28 | End: 2021-04-02 | Stop reason: SDUPTHER

## 2021-04-02 ENCOUNTER — TELEPHONE (OUTPATIENT)
Dept: INTERNAL MEDICINE | Facility: CLINIC | Age: 76
End: 2021-04-02

## 2021-04-02 RX ORDER — BUPROPION HYDROCHLORIDE 300 MG/1
300 TABLET ORAL DAILY
Qty: 90 TABLET | Refills: 1 | Status: SHIPPED | OUTPATIENT
Start: 2021-04-02

## 2021-04-02 RX ORDER — LEVOTHYROXINE SODIUM 137 UG/1
137 TABLET ORAL DAILY
Qty: 90 TABLET | Refills: 1 | Status: SHIPPED | OUTPATIENT
Start: 2021-04-02

## 2021-04-02 RX ORDER — BUPROPION HYDROCHLORIDE 150 MG/1
150 TABLET ORAL DAILY
Qty: 90 TABLET | Refills: 1 | Status: SHIPPED | OUTPATIENT
Start: 2021-04-02

## 2021-04-02 RX ORDER — MELOXICAM 15 MG/1
15 TABLET ORAL DAILY PRN
Qty: 90 TABLET | Refills: 1 | Status: SHIPPED | OUTPATIENT
Start: 2021-04-02

## 2021-04-02 NOTE — TELEPHONE ENCOUNTER
LV: 12/23/20, NV: 4/8/21, She would like her scripts sent in for 90 days to [facility].  She just picked up a fill at [facility].   Meloxicam 15 mg, bupropion 150 mg and 300 mg, levothyroxine 137 mcg, I let her know the alprazolam would not be filled until her appointment.

## 2021-04-08 ENCOUNTER — OFFICE VISIT (OUTPATIENT)
Dept: INTERNAL MEDICINE | Facility: CLINIC | Age: 76
End: 2021-04-08

## 2021-04-08 VITALS
HEART RATE: 92 BPM | TEMPERATURE: 98 F | HEIGHT: 60 IN | WEIGHT: 215.4 LBS | DIASTOLIC BLOOD PRESSURE: 80 MMHG | OXYGEN SATURATION: 98 % | BODY MASS INDEX: 42.29 KG/M2 | SYSTOLIC BLOOD PRESSURE: 132 MMHG

## 2021-04-08 DIAGNOSIS — Z72.0 TOBACCO ABUSE: Chronic | ICD-10-CM

## 2021-04-08 DIAGNOSIS — F41.9 ANXIETY: Chronic | ICD-10-CM

## 2021-04-08 DIAGNOSIS — E03.9 ACQUIRED HYPOTHYROIDISM: Primary | Chronic | ICD-10-CM

## 2021-04-08 PROCEDURE — 99213 OFFICE O/P EST LOW 20 MIN: CPT | Performed by: INTERNAL MEDICINE

## 2021-04-08 RX ORDER — ALPRAZOLAM 0.5 MG/1
TABLET ORAL
Qty: 60 TABLET | Refills: 0 | Status: SHIPPED | OUTPATIENT
Start: 2021-04-08

## 2021-04-08 RX ORDER — MULTIVIT-MIN/IRON/FOLIC ACID/K 18-600-40
2000 CAPSULE ORAL DAILY
COMMUNITY

## 2021-04-08 NOTE — PROGRESS NOTES
Chief Complaint  Anxiety (follow up), Med Refill, and Hypothyroidism    Subjective          Carol Cooley presents to [facility]  History of Present Illness    Anxiety/insomnia - takes xanax 1-2/day generally. No problems w/ it. She did fill a few days ago, and will be moving to [place] soon and would like to go ahead and get 1 more refill sent to the mail order so that she will have enough until she finds a new doctor    Hypothyroid-she is on Synthroid 137 mcg. TSH was done 4 months ago and was normal    Tobacco abuse-smokes 10 cigarettes/day on average      Current Outpatient Medications:   •  albuterol sulfate  (90 Base) MCG/ACT inhaler, Inhale 2 puffs Every 4 (Four) Hours As Needed for Wheezing., Disp: 1 inhaler, Rfl: 11  •  ALPRAZolam (XANAX) 0.5 MG tablet, 1 bid prn, Disp: 60 tablet, Rfl: 0  •  buPROPion XL (WELLBUTRIN XL) 150 MG 24 hr tablet, Take 1 tablet by mouth Daily., Disp: 90 tablet, Rfl: 1  •  buPROPion XL (WELLBUTRIN XL) 300 MG 24 hr tablet, Take 1 tablet by mouth Daily., Disp: 90 tablet, Rfl: 1  •  Cholecalciferol (Vitamin D) 50 MCG (2000 UT) capsule, Take 2,000 Units by mouth Daily., Disp: , Rfl:   •  clobetasol (TEMOVATE) 0.05 % ointment, Apply  topically to the appropriate area as directed 2 (Two) Times a Day As Needed., Disp: , Rfl:   •  fluticasone (FLONASE) 50 MCG/ACT nasal spray, 2 sprays into the nostril(s) as directed by provider Daily., Disp: 1 bottle, Rfl: 11  •  fluticasone-salmeterol (ADVAIR DISKUS) 250-50 MCG/DOSE DISKUS, Inhale 1 puff 2 (Two) Times a Day., Disp: 60 each, Rfl: 11  •  gabapentin (NEURONTIN) 300 MG capsule, Take 300 mg by mouth 4 (Four) Times a Day., Disp: , Rfl:   •  hydroxychloroquine (PLAQUENIL) 200 MG tablet, Take 200 mg by mouth daily., Disp: , Rfl:   •  levothyroxine (SYNTHROID, LEVOTHROID) 137 MCG tablet, Take 1 tablet by mouth Daily., Disp: 90 tablet, Rfl: 1  •  meloxicam (MOBIC) 15 MG tablet, Take 1 tablet by mouth  "Daily As Needed for Moderate Pain ., Disp: 90 tablet, Rfl: 1      Objective   Vital Signs:   /80 (BP Location: Left arm, Patient Position: Sitting)   Pulse 92   Temp 98 °F (36.7 °C) (Infrared)   Ht 152.4 cm (60\")   Wt 97.7 kg (215 lb 6.4 oz)   SpO2 98%   BMI 42.07 kg/m²       Physical exam  Constitutional: oriented to person, place, and time.  well-developed and well-nourished. No distress.   HENT:   Head: Normocephalic and atraumatic.   Eyes: Conjunctivae and EOM are normal.   Cardiovascular: Normal rate, regular rhythm and normal heart sounds.  Exam reveals no gallop and no friction rub.    No murmur heard.  Pulmonary/Chest: Effort normal and breath sounds normal. No respiratory distress.   no wheezes.   Neurological:  alert and oriented to person, place, and time.   Skin: Skin is warm and dry. not diaphoretic.   Psychiatric:  normal mood and affect. behavior is normal. Judgment and thought content normal.      Result Review :   The following data was reviewed by: Briana Nelson MD on 04/08/2021:  CMP    CMP 5/28/20 12/28/20   Glucose  90   Glucose 90    BUN 22 18   Creatinine 0.93 0.77   eGFR Non  Am 59 (A) 73   eGFR  Am 72    Sodium 142 143   Potassium 4.4 4.6   Chloride 109 (A) 108 (A)   Calcium 9.3 9.4   Total Protein 6.6    Albumin 4.40 4.30   Globulin 2.2    Total Bilirubin 0.4 0.3   Alkaline Phosphatase 60 65   AST (SGOT) 26 28   ALT (SGPT) 28 28   (A) Abnormal value       Comments are available for some flowsheets but are not being displayed.           CBC w/diff    CBC w/Diff 5/28/20 12/28/20   WBC 9.52 8.65   RBC 4.57 4.60   Hemoglobin 14.4 14.3   Hematocrit 40.7 41.7   MCV 89.1 90.7   MCH 31.5 31.1   MCHC 35.4 34.3   RDW 11.7 (A) 12.0 (A)   Platelets 200 247   Neutrophil Rel % 67.3 63.2   Immature Granulocyte Rel %  0.3   Lymphocyte Rel % 21.3 24.9   Monocyte Rel % 6.7 6.0   Eosinophil Rel % 3.3 4.3   Basophil Rel % 1.1 1.3   (A) Abnormal value            TSH    TSH 5/28/20 " 12/28/20   TSH 2.230 2.410      Comments are available for some flowsheets but are not being displayed.           Last Urine Toxicity     LAST URINE TOXICITY RESULTS Latest Ref Rng & Units 12/28/2020    CREATININE UR 20.0 - 300.0 mg/dL 37.8    AMPHETAMINES SCREEN, URINE Spyoby=7911 ng/mL Negative    BARBITURATES SCREEN Sdmldl=096 ng/mL Negative    BENZODIAZEPINE SCREEN, URINE Shcine=635 ng/mL Positive(A)    COCAINE SCREEN, URINE Holxzl=411 ng/mL Negative    METHADONE SCREEN, URINE Ffdkqf=232 ng/mL Negative                  Assessment and Plan    Diagnoses and all orders for this visit:    1. Acquired hypothyroidism (Primary)  Comments:  recheck in 12/21    2. Anxiety  Comments:  Stable on Xanax, xanax refilled to mail order. she has signed destin OLSEN approriate, UDS due 12/21  Orders:  -     ALPRAZolam (XANAX) 0.5 MG tablet; 1 bid prn  Dispense: 60 tablet; Refill: 0    3. Tobacco abuse  Comments:  Smoking cessation encouraged        Follow Up   No follow-ups on file.  Patient was given instructions and counseling regarding her condition or for health maintenance advice. Please see specific information pulled into the AVS if appropriate.     Prev: got her covid vaccines

## 2021-09-16 NOTE — TELEPHONE ENCOUNTER
LV: 12/23/20  NV: not scheduled  LL: 12/28/20  LF:4/2/21 90/1 on both    Did you want her to come back in for an appointment and labs?

## 2021-09-17 RX ORDER — MELOXICAM 15 MG/1
TABLET ORAL
Qty: 90 TABLET | Refills: 1 | OUTPATIENT
Start: 2021-09-17

## 2021-09-17 RX ORDER — LEVOTHYROXINE SODIUM 137 UG/1
TABLET ORAL
Qty: 90 TABLET | Refills: 1 | OUTPATIENT
Start: 2021-09-17